# Patient Record
Sex: MALE | Race: WHITE | Employment: OTHER | URBAN - NONMETROPOLITAN AREA
[De-identification: names, ages, dates, MRNs, and addresses within clinical notes are randomized per-mention and may not be internally consistent; named-entity substitution may affect disease eponyms.]

---

## 2022-06-29 ENCOUNTER — OFFICE VISIT (OUTPATIENT)
Dept: FAMILY MEDICINE CLINIC | Age: 52
End: 2022-06-29
Payer: COMMERCIAL

## 2022-06-29 ENCOUNTER — NURSE ONLY (OUTPATIENT)
Dept: LAB | Age: 52
End: 2022-06-29
Payer: COMMERCIAL

## 2022-06-29 ENCOUNTER — HOSPITAL ENCOUNTER (OUTPATIENT)
Dept: LAB | Age: 52
Discharge: HOME | End: 2022-06-29
Payer: COMMERCIAL

## 2022-06-29 VITALS
RESPIRATION RATE: 16 BRPM | SYSTOLIC BLOOD PRESSURE: 190 MMHG | DIASTOLIC BLOOD PRESSURE: 120 MMHG | HEART RATE: 68 BPM | OXYGEN SATURATION: 98 % | WEIGHT: 221 LBS | BODY MASS INDEX: 33.49 KG/M2 | HEIGHT: 68 IN | TEMPERATURE: 98.4 F

## 2022-06-29 DIAGNOSIS — Z00.00 ROUTINE GENERAL MEDICAL EXAMINATION AT A HEALTH CARE FACILITY: Primary | ICD-10-CM

## 2022-06-29 DIAGNOSIS — I10 PRIMARY HYPERTENSION: ICD-10-CM

## 2022-06-29 DIAGNOSIS — Z11.59 ENCOUNTER FOR HCV SCREENING TEST FOR LOW RISK PATIENT: ICD-10-CM

## 2022-06-29 DIAGNOSIS — Z23 NEED FOR TDAP VACCINATION: ICD-10-CM

## 2022-06-29 DIAGNOSIS — Z11.4 SCREENING FOR HUMAN IMMUNODEFICIENCY VIRUS WITHOUT PRESENCE OF RISK FACTORS: ICD-10-CM

## 2022-06-29 DIAGNOSIS — Z12.5 SCREENING FOR PROSTATE CANCER: ICD-10-CM

## 2022-06-29 DIAGNOSIS — Z23 NEED FOR TDAP VACCINATION: Primary | ICD-10-CM

## 2022-06-29 DIAGNOSIS — Z23 NEED FOR SHINGLES VACCINE: ICD-10-CM

## 2022-06-29 DIAGNOSIS — Z00.00 ROUTINE GENERAL MEDICAL EXAMINATION AT A HEALTH CARE FACILITY: ICD-10-CM

## 2022-06-29 LAB
ABSOLUTE EOS #: 0.04 K/UL (ref 0–0.44)
ABSOLUTE IMMATURE GRANULOCYTE: <0.03 K/UL (ref 0–0.3)
ABSOLUTE LYMPH #: 0.94 K/UL (ref 1.1–3.7)
ABSOLUTE MONO #: 0.5 K/UL (ref 0.1–1.2)
ALBUMIN SERPL-MCNC: 4.4 G/DL (ref 3.5–5.2)
ALBUMIN/GLOBULIN RATIO: 1.5 (ref 1–2.5)
ALP BLD-CCNC: 48 U/L (ref 40–129)
ALT SERPL-CCNC: 19 U/L (ref 5–41)
ANION GAP SERPL CALCULATED.3IONS-SCNC: 11 MMOL/L (ref 9–17)
AST SERPL-CCNC: 18 U/L
BASOPHILS # BLD: 1 % (ref 0–2)
BASOPHILS ABSOLUTE: 0.03 K/UL (ref 0–0.2)
BILIRUB SERPL-MCNC: 0.57 MG/DL (ref 0.3–1.2)
BUN BLDV-MCNC: 13 MG/DL (ref 6–20)
BUN/CREAT BLD: 13 (ref 9–20)
CALCIUM SERPL-MCNC: 9.5 MG/DL (ref 8.6–10.4)
CHLORIDE BLD-SCNC: 100 MMOL/L (ref 98–107)
CHOLESTEROL/HDL RATIO: 2.4
CHOLESTEROL: 175 MG/DL
CO2: 25 MMOL/L (ref 20–31)
CREAT SERPL-MCNC: 1.03 MG/DL (ref 0.7–1.2)
EOSINOPHILS RELATIVE PERCENT: 1 % (ref 1–4)
GFR AFRICAN AMERICAN: >60 ML/MIN
GFR NON-AFRICAN AMERICAN: >60 ML/MIN
GFR SERPL CREATININE-BSD FRML MDRD: NORMAL ML/MIN/{1.73_M2}
GLUCOSE BLD-MCNC: 95 MG/DL (ref 70–99)
HCT VFR BLD CALC: 42.3 % (ref 40.7–50.3)
HDLC SERPL-MCNC: 74 MG/DL
HEMOGLOBIN: 14.2 G/DL (ref 13–17)
HEPATITIS C ANTIBODY: NONREACTIVE
HIV AG/AB: NONREACTIVE
IMMATURE GRANULOCYTES: 0 %
LDL CHOLESTEROL: 86 MG/DL (ref 0–130)
LYMPHOCYTES # BLD: 18 % (ref 24–43)
MCH RBC QN AUTO: 29.9 PG (ref 25.2–33.5)
MCHC RBC AUTO-ENTMCNC: 33.6 G/DL (ref 25.2–33.5)
MCV RBC AUTO: 89.1 FL (ref 82.6–102.9)
MONOCYTES # BLD: 9 % (ref 3–12)
NRBC AUTOMATED: 0 PER 100 WBC
PDW BLD-RTO: 13.5 % (ref 11.8–14.4)
PLATELET # BLD: 207 K/UL (ref 138–453)
PMV BLD AUTO: 10.6 FL (ref 8.1–13.5)
POTASSIUM SERPL-SCNC: 4.2 MMOL/L (ref 3.7–5.3)
PROSTATE SPECIFIC ANTIGEN: 1.68 NG/ML
RBC # BLD: 4.75 M/UL (ref 4.21–5.77)
SEG NEUTROPHILS: 71 % (ref 36–65)
SEGMENTED NEUTROPHILS ABSOLUTE COUNT: 3.84 K/UL (ref 1.5–8.1)
SODIUM BLD-SCNC: 136 MMOL/L (ref 135–144)
TOTAL PROTEIN: 7.4 G/DL (ref 6.4–8.3)
TRIGL SERPL-MCNC: 75 MG/DL
TSH SERPL DL<=0.05 MIU/L-ACNC: 1.28 UIU/ML (ref 0.3–5)
WBC # BLD: 5.4 K/UL (ref 3.5–11.3)

## 2022-06-29 PROCEDURE — 90471 IMMUNIZATION ADMIN: CPT | Performed by: FAMILY MEDICINE

## 2022-06-29 PROCEDURE — 86803 HEPATITIS C AB TEST: CPT

## 2022-06-29 PROCEDURE — 90472 IMMUNIZATION ADMIN EACH ADD: CPT | Performed by: FAMILY MEDICINE

## 2022-06-29 PROCEDURE — 90715 TDAP VACCINE 7 YRS/> IM: CPT | Performed by: FAMILY MEDICINE

## 2022-06-29 PROCEDURE — 84443 ASSAY THYROID STIM HORMONE: CPT

## 2022-06-29 PROCEDURE — 87389 HIV-1 AG W/HIV-1&-2 AB AG IA: CPT

## 2022-06-29 PROCEDURE — 90750 HZV VACC RECOMBINANT IM: CPT | Performed by: FAMILY MEDICINE

## 2022-06-29 PROCEDURE — G0103 PSA SCREENING: HCPCS

## 2022-06-29 PROCEDURE — 80053 COMPREHEN METABOLIC PANEL: CPT

## 2022-06-29 PROCEDURE — 80061 LIPID PANEL: CPT

## 2022-06-29 PROCEDURE — 85025 COMPLETE CBC W/AUTO DIFF WBC: CPT

## 2022-06-29 PROCEDURE — 36415 COLL VENOUS BLD VENIPUNCTURE: CPT

## 2022-06-29 PROCEDURE — 99386 PREV VISIT NEW AGE 40-64: CPT | Performed by: FAMILY MEDICINE

## 2022-06-29 RX ORDER — LISINOPRIL AND HYDROCHLOROTHIAZIDE 25; 20 MG/1; MG/1
1 TABLET ORAL DAILY
Qty: 90 TABLET | Refills: 1 | Status: SHIPPED | OUTPATIENT
Start: 2022-06-29 | End: 2022-07-14 | Stop reason: SDUPTHER

## 2022-06-29 SDOH — ECONOMIC STABILITY: FOOD INSECURITY: WITHIN THE PAST 12 MONTHS, YOU WORRIED THAT YOUR FOOD WOULD RUN OUT BEFORE YOU GOT MONEY TO BUY MORE.: NEVER TRUE

## 2022-06-29 SDOH — ECONOMIC STABILITY: FOOD INSECURITY: WITHIN THE PAST 12 MONTHS, THE FOOD YOU BOUGHT JUST DIDN'T LAST AND YOU DIDN'T HAVE MONEY TO GET MORE.: NEVER TRUE

## 2022-06-29 ASSESSMENT — ENCOUNTER SYMPTOMS
COUGH: 0
CONSTIPATION: 0
WHEEZING: 0
SINUS PRESSURE: 0
SHORTNESS OF BREATH: 0
NAUSEA: 0
RHINORRHEA: 0
VOMITING: 0
DIARRHEA: 0
SORE THROAT: 0
ABDOMINAL PAIN: 0
EYE DISCHARGE: 0
EYE REDNESS: 0
TROUBLE SWALLOWING: 0

## 2022-06-29 ASSESSMENT — PATIENT HEALTH QUESTIONNAIRE - PHQ9
SUM OF ALL RESPONSES TO PHQ QUESTIONS 1-9: 0
2. FEELING DOWN, DEPRESSED OR HOPELESS: 0
1. LITTLE INTEREST OR PLEASURE IN DOING THINGS: 0
SUM OF ALL RESPONSES TO PHQ QUESTIONS 1-9: 0
SUM OF ALL RESPONSES TO PHQ9 QUESTIONS 1 & 2: 0

## 2022-06-29 ASSESSMENT — SOCIAL DETERMINANTS OF HEALTH (SDOH): HOW HARD IS IT FOR YOU TO PAY FOR THE VERY BASICS LIKE FOOD, HOUSING, MEDICAL CARE, AND HEATING?: NOT HARD AT ALL

## 2022-06-29 NOTE — PROGRESS NOTES
2022     Elroy Gottlieb (:  1970) is a 49 y.o. male, here for evaluation of the following medical concerns:    HPI  Patient comes in today for a general physical exam and to discuss elevated blood pressure readings. Patient states he went to the eye specialist yesterday to get his vision checked and they apparently checked his blood pressure at that time. It was quite elevated at 190/120 and then subsequently was retaken yesterday evening by his sister who is a nurse practitioner and it was 199/126. He has never been diagnosed with hypertension. He has not really been into see a physician in the recent past.  Has not had any secondary symptoms such as chest pain shortness of breath blurred vision headache numbness tingling or weakness in any other extremities. Patient has not had any routine lab work done recently and so I did suggest getting updated lab work for general preventative screening. He otherwise has no other acute medical concerns. .  Patient's recent lab reports are as follows: No results found for this or any previous visit. Other review of systems are as noted below. Preventative measures are reviewed today. See health maintenance section for complete preventative plan of care. History reviewed. No pertinent past medical history. Past Surgical History:   Procedure Laterality Date    CERVICAL DISC SURGERY      HERNIA REPAIR       Family History   Problem Relation Age of Onset    Cancer Mother         pancreatic-age 61    High Blood Pressure Father     High Blood Pressure Sister      Allergies   Allergen Reactions    Pcn [Penicillins]      Unknown reaction as child           Review of Systems   Constitutional: Negative for chills, fatigue and fever. HENT: Negative for congestion, ear pain, postnasal drip, rhinorrhea, sinus pressure, sore throat and trouble swallowing. Eyes: Negative for discharge and redness.    Respiratory: Negative for cough, shortness of breath and wheezing. Cardiovascular: Negative for chest pain. Gastrointestinal: Negative for abdominal pain, constipation, diarrhea, nausea and vomiting. Genitourinary: Negative for dysuria, flank pain, frequency and urgency. Musculoskeletal: Negative for arthralgias, myalgias and neck pain. Skin: Negative for rash and wound. Allergic/Immunologic: Negative for environmental allergies. Neurological: Negative for dizziness, weakness, light-headedness and headaches. Hematological: Negative for adenopathy. Psychiatric/Behavioral: Negative. Prior to Visit Medications    Medication Sig Taking? Authorizing Provider   Tdap (ADACEL) 5-2-15.5 LF-MCG/0.5 injection Inject 0.5 mLs into the muscle once for 1 dose Yes Gaby Fuelling, DO   zoster recombinant adjuvanted vaccine [facility]) 50 MCG/0.5ML SUSR injection Inject 0.5 mLs into the muscle once for 1 dose Yes Gaby Fuelling, DO   lisinopril-hydroCHLOROthiazide (PRINZIDE;ZESTORETIC) 20-25 MG per tablet Take 1 tablet by mouth daily Yes Gaby Fuelling, DO        Social History     Tobacco Use    Smoking status: Never Smoker    Smokeless tobacco: Current User     Types: Chew   Substance Use Topics    Alcohol use: Yes        Vitals:    06/29/22 1159 06/29/22 1207   BP: (!) 160/116 (!) 190/120   Site: Left Upper Arm Left Upper Arm   Position: Sitting Sitting   Cuff Size: Large Adult Large Adult   Pulse: 68    Resp: 16    Temp: 98.4 °F (36.9 °C)    TempSrc: Tympanic    SpO2: 98%    Weight: 221 lb (100.2 kg)    Height: 5' 8\" (1.727 m)      Estimated body mass index is 33.6 kg/m² as calculated from the following:    Height as of this encounter: 5' 8\" (1.727 m). Weight as of this encounter: 221 lb (100.2 kg). Physical Exam  Vitals and nursing note reviewed. Constitutional:       General: He is not in acute distress. Appearance: Normal appearance. He is well-developed. He is not diaphoretic.    HENT:      Head: Normocephalic and atraumatic. Right Ear: Tympanic membrane, ear canal and external ear normal.      Left Ear: Tympanic membrane, ear canal and external ear normal.      Nose: Nose normal.      Mouth/Throat:      Mouth: Mucous membranes are moist.      Pharynx: Oropharynx is clear. No oropharyngeal exudate. Eyes:      General:         Right eye: No discharge. Left eye: No discharge. Conjunctiva/sclera: Conjunctivae normal.      Pupils: Pupils are equal, round, and reactive to light. Neck:      Thyroid: No thyromegaly. Cardiovascular:      Rate and Rhythm: Normal rate and regular rhythm. Heart sounds: Normal heart sounds. Pulmonary:      Effort: Pulmonary effort is normal.      Breath sounds: Normal breath sounds. No wheezing or rales. Abdominal:      General: Bowel sounds are normal. There is no distension. Palpations: Abdomen is soft. Tenderness: There is no abdominal tenderness. Musculoskeletal:      Cervical back: Normal range of motion and neck supple. Lymphadenopathy:      Cervical: No cervical adenopathy. Skin:     General: Skin is warm and dry. Findings: No rash. Neurological:      Mental Status: He is alert and oriented to person, place, and time. Psychiatric:         Behavior: Behavior normal.         Thought Content: Thought content normal.         Judgment: Judgment normal.           ASSESSMENT/PLAN:  Encounter Diagnoses   Name Primary?     Routine general medical examination at a health care facility Yes    Primary hypertension     Screening for human immunodeficiency virus without presence of risk factors     Encounter for HCV screening test for low risk patient     Need for Tdap vaccination     Screening for prostate cancer      Orders Placed This Encounter   Medications    Tdap (ADACEL) 5-2-15.5 LF-MCG/0.5 injection     Sig: Inject 0.5 mLs into the muscle once for 1 dose     Dispense:  1 each     Refill:  0    zoster recombinant adjuvanted vaccine [facility]) 50 MCG/0.5ML SUSR injection     Sig: Inject 0.5 mLs into the muscle once for 1 dose     Dispense:  1 each     Refill:  1    lisinopril-hydroCHLOROthiazide (PRINZIDE;ZESTORETIC) 20-25 MG per tablet     Sig: Take 1 tablet by mouth daily     Dispense:  90 tablet     Refill:  1     Orders Placed This Encounter   Procedures    Hepatitis C Antibody     Standing Status:   Future     Number of Occurrences:   1     Standing Expiration Date:   6/29/2023    HIV Screen     Standing Status:   Future     Number of Occurrences:   1     Standing Expiration Date:   6/29/2023    CBC with Auto Differential     Standing Status:   Future     Number of Occurrences:   1     Standing Expiration Date:   6/29/2023    Comprehensive Metabolic Panel     Standing Status:   Future     Number of Occurrences:   1     Standing Expiration Date:   6/29/2023    Lipid Panel     Standing Status:   Future     Number of Occurrences:   1     Standing Expiration Date:   6/29/2023     Order Specific Question:   Is Patient Fasting?/# of Hours     Answer:   12    TSH     Standing Status:   Future     Number of Occurrences:   1     Standing Expiration Date:   6/29/2023    PSA Screening     Standing Status:   Future     Number of Occurrences:   1     Standing Expiration Date:   6/29/2023     Did start patient on lisinopril hydrochlorothiazide to help with his blood pressure control. Did discuss secondary symptoms of hypertension and if he does develop any concerning symptoms he does need to go to the emergency room. My suspicion is that he has had elevated blood pressure for some time and just was unaware of it. Do want him to check his blood pressure at home. We will have him come back in 2 weeks for blood pressure recheck. Patient is to get  labwork done today. We will make further recommendations once testing reports are available.     Patient is to return to my office in 2 weeks for a med recheck or sooner if any further problems or symptoms arise. (Please note that portions of this note were completed with a voice recognition program. Efforts were made to edit the dictations but occasionally words are mis-transcribed.)        Return in about 2 weeks (around 7/13/2022). An electronic signature was used to authenticate this note.     --Mary Ann Cordero, DO on 6/29/2022 at 2:34 PM

## 2022-06-29 NOTE — PROGRESS NOTES
Immunizations given as ordered. Patient tolerated it well. No questions re:VIS information. Patient instructed to return after 2 months for 2nd Shingrix vaccine.

## 2022-07-14 ENCOUNTER — HOSPITAL ENCOUNTER (OUTPATIENT)
Dept: LAB | Age: 52
Discharge: HOME | End: 2022-07-14
Payer: COMMERCIAL

## 2022-07-14 ENCOUNTER — OFFICE VISIT (OUTPATIENT)
Dept: FAMILY MEDICINE CLINIC | Age: 52
End: 2022-07-14
Payer: COMMERCIAL

## 2022-07-14 VITALS
OXYGEN SATURATION: 99 % | BODY MASS INDEX: 33.34 KG/M2 | DIASTOLIC BLOOD PRESSURE: 70 MMHG | WEIGHT: 220 LBS | HEIGHT: 68 IN | TEMPERATURE: 97.7 F | SYSTOLIC BLOOD PRESSURE: 130 MMHG | HEART RATE: 83 BPM

## 2022-07-14 DIAGNOSIS — Z12.5 SCREENING FOR PROSTATE CANCER: ICD-10-CM

## 2022-07-14 DIAGNOSIS — I10 PRIMARY HYPERTENSION: Primary | ICD-10-CM

## 2022-07-14 DIAGNOSIS — I10 PRIMARY HYPERTENSION: ICD-10-CM

## 2022-07-14 DIAGNOSIS — Z00.00 ROUTINE GENERAL MEDICAL EXAMINATION AT A HEALTH CARE FACILITY: ICD-10-CM

## 2022-07-14 DIAGNOSIS — B35.1 ONYCHOMYCOSIS: ICD-10-CM

## 2022-07-14 LAB
ABSOLUTE EOS #: 0.04 K/UL (ref 0–0.44)
ABSOLUTE IMMATURE GRANULOCYTE: <0.03 K/UL (ref 0–0.3)
ABSOLUTE LYMPH #: 1.31 K/UL (ref 1.1–3.7)
ABSOLUTE MONO #: 0.66 K/UL (ref 0.1–1.2)
ALBUMIN SERPL-MCNC: 4.5 G/DL (ref 3.5–5.2)
ALBUMIN/GLOBULIN RATIO: 1.3 (ref 1–2.5)
ALP BLD-CCNC: 52 U/L (ref 40–129)
ALT SERPL-CCNC: 17 U/L (ref 5–41)
ANION GAP SERPL CALCULATED.3IONS-SCNC: 13 MMOL/L (ref 9–17)
AST SERPL-CCNC: 17 U/L
BASOPHILS # BLD: 1 % (ref 0–2)
BASOPHILS ABSOLUTE: 0.05 K/UL (ref 0–0.2)
BILIRUB SERPL-MCNC: 0.46 MG/DL (ref 0.3–1.2)
BUN BLDV-MCNC: 26 MG/DL (ref 6–20)
BUN/CREAT BLD: 23 (ref 9–20)
CALCIUM SERPL-MCNC: 10.2 MG/DL (ref 8.6–10.4)
CHLORIDE BLD-SCNC: 95 MMOL/L (ref 98–107)
CHOLESTEROL/HDL RATIO: 3.3
CHOLESTEROL: 192 MG/DL
CO2: 26 MMOL/L (ref 20–31)
CREAT SERPL-MCNC: 1.12 MG/DL (ref 0.7–1.2)
EOSINOPHILS RELATIVE PERCENT: 1 % (ref 1–4)
GFR AFRICAN AMERICAN: >60 ML/MIN
GFR NON-AFRICAN AMERICAN: >60 ML/MIN
GFR SERPL CREATININE-BSD FRML MDRD: ABNORMAL ML/MIN/{1.73_M2}
GLUCOSE BLD-MCNC: 111 MG/DL (ref 70–99)
HCT VFR BLD CALC: 44.2 % (ref 40.7–50.3)
HDLC SERPL-MCNC: 58 MG/DL
HEMOGLOBIN: 14.8 G/DL (ref 13–17)
IMMATURE GRANULOCYTES: 0 %
LDL CHOLESTEROL: 110 MG/DL (ref 0–130)
LYMPHOCYTES # BLD: 18 % (ref 24–43)
MCH RBC QN AUTO: 29.8 PG (ref 25.2–33.5)
MCHC RBC AUTO-ENTMCNC: 33.5 G/DL (ref 25.2–33.5)
MCV RBC AUTO: 88.9 FL (ref 82.6–102.9)
MONOCYTES # BLD: 9 % (ref 3–12)
NRBC AUTOMATED: 0 PER 100 WBC
PDW BLD-RTO: 13 % (ref 11.8–14.4)
PLATELET # BLD: 241 K/UL (ref 138–453)
PMV BLD AUTO: 10.6 FL (ref 8.1–13.5)
POTASSIUM SERPL-SCNC: 3.9 MMOL/L (ref 3.7–5.3)
PROSTATE SPECIFIC ANTIGEN: 1.68 NG/ML
RBC # BLD: 4.97 M/UL (ref 4.21–5.77)
SEG NEUTROPHILS: 71 % (ref 36–65)
SEGMENTED NEUTROPHILS ABSOLUTE COUNT: 5.2 K/UL (ref 1.5–8.1)
SODIUM BLD-SCNC: 134 MMOL/L (ref 135–144)
TOTAL PROTEIN: 8 G/DL (ref 6.4–8.3)
TRIGL SERPL-MCNC: 119 MG/DL
WBC # BLD: 7.3 K/UL (ref 3.5–11.3)

## 2022-07-14 PROCEDURE — 85025 COMPLETE CBC W/AUTO DIFF WBC: CPT

## 2022-07-14 PROCEDURE — 36415 COLL VENOUS BLD VENIPUNCTURE: CPT

## 2022-07-14 PROCEDURE — 99214 OFFICE O/P EST MOD 30 MIN: CPT | Performed by: FAMILY MEDICINE

## 2022-07-14 PROCEDURE — 3017F COLORECTAL CA SCREEN DOC REV: CPT | Performed by: FAMILY MEDICINE

## 2022-07-14 PROCEDURE — G8427 DOCREV CUR MEDS BY ELIG CLIN: HCPCS | Performed by: FAMILY MEDICINE

## 2022-07-14 PROCEDURE — 4004F PT TOBACCO SCREEN RCVD TLK: CPT | Performed by: FAMILY MEDICINE

## 2022-07-14 PROCEDURE — G0103 PSA SCREENING: HCPCS

## 2022-07-14 PROCEDURE — 80061 LIPID PANEL: CPT

## 2022-07-14 PROCEDURE — 80053 COMPREHEN METABOLIC PANEL: CPT

## 2022-07-14 PROCEDURE — G8417 CALC BMI ABV UP PARAM F/U: HCPCS | Performed by: FAMILY MEDICINE

## 2022-07-14 RX ORDER — LISINOPRIL AND HYDROCHLOROTHIAZIDE 25; 20 MG/1; MG/1
1 TABLET ORAL 2 TIMES DAILY
Qty: 180 TABLET | Refills: 3 | Status: SHIPPED | OUTPATIENT
Start: 2022-07-14 | End: 2022-10-31 | Stop reason: SINTOL

## 2022-07-14 RX ORDER — TERBINAFINE HYDROCHLORIDE 250 MG/1
250 TABLET ORAL DAILY
Qty: 90 TABLET | Refills: 0 | Status: SHIPPED | OUTPATIENT
Start: 2022-07-14 | End: 2022-10-12

## 2022-07-14 ASSESSMENT — ENCOUNTER SYMPTOMS
EYE REDNESS: 0
RHINORRHEA: 0
SORE THROAT: 0
SINUS PRESSURE: 0
DIARRHEA: 0
NAUSEA: 0
VOMITING: 0
EYE DISCHARGE: 0
WHEEZING: 0
CONSTIPATION: 0
TROUBLE SWALLOWING: 0
COUGH: 0
ABDOMINAL PAIN: 0
COLOR CHANGE: 1
SHORTNESS OF BREATH: 0

## 2022-07-14 NOTE — PROGRESS NOTES
2022     Ti Gaming (:  1970) is a 49 y.o. male, here for evaluation of the following medical concerns:    HPI  Patient comes in today for follow-up regarding his hypertension. Patient was started on antihypertensive medication at his last visit after he had went to get his eyes checked and when they checked his blood pressure it was quite elevated. He did do well with the medication initially as his pressure dropped quite a bit but then it started to start creeping up. After 5 days duration he ultimately did start taking the medication twice a day and since then his blood pressure has been improved. The only downside is that his muscles have been cramping slightly. He did state that he started to drink more water when his muscle started to cramp and that did seem to help but still felt some muscle aching. He did go and get an over-the-counter potassium supplement has been taking 1 a day over the last few days which has helped. Currently not having any issues with significant muscular pain but I did advise him we should get a basic metabolic panel just to check his potassium level with the higher dose blood pressure medication. He seems to be tolerating this relatively well. Patient's only other acute issue today is that he does have 3 toes on his right foot that do have evidence of fungus. He states that they have been that way for quite some time and he has used over-the-counter topical treatments with little relief. Wondering about getting something prescription based that will help clear his toenail fungus. Does not have any other acute medical concerns at this time to discuss. Did review patient's med list, allergies, social history, fam history, pmhx and pshx today as noted in the record. Review of Systems   Constitutional: Negative for chills, fatigue and fever.    HENT: Negative for congestion, ear pain, postnasal drip, rhinorrhea, sinus pressure, sore throat and trouble swallowing. Eyes: Negative for discharge and redness. Respiratory: Negative for cough, shortness of breath and wheezing. Cardiovascular: Negative for chest pain. Gastrointestinal: Negative for abdominal pain, constipation, diarrhea, nausea and vomiting. Genitourinary: Negative for dysuria, flank pain, frequency and urgency. Musculoskeletal: Positive for myalgias. Negative for arthralgias and neck pain. Skin: Positive for color change (thickened discolored toenails). Negative for rash and wound. Allergic/Immunologic: Negative for environmental allergies. Neurological: Negative for dizziness, weakness, light-headedness and headaches. Hematological: Negative for adenopathy. Psychiatric/Behavioral: Negative. Prior to Visit Medications    Medication Sig Taking? Authorizing Provider   lisinopril-hydroCHLOROthiazide (PRINZIDE;ZESTORETIC) 20-25 MG per tablet Take 1 tablet by mouth daily  Jayson Hill, DO        Social History     Tobacco Use    Smoking status: Never Smoker    Smokeless tobacco: Current User     Types: Chew   Substance Use Topics    Alcohol use: Yes        There were no vitals filed for this visit. Estimated body mass index is 33.6 kg/m² as calculated from the following:    Height as of 6/29/22: 5' 8\" (1.727 m). Weight as of 6/29/22: 221 lb (100.2 kg). Physical Exam  Vitals and nursing note reviewed. Constitutional:       General: He is not in acute distress. Appearance: He is well-developed. He is not diaphoretic. HENT:      Head: Normocephalic and atraumatic. Eyes:      Conjunctiva/sclera: Conjunctivae normal.   Cardiovascular:      Rate and Rhythm: Normal rate and regular rhythm. Pulmonary:      Effort: Pulmonary effort is normal.      Breath sounds: No wheezing, rhonchi or rales. Musculoskeletal:      Cervical back: Normal range of motion. Right lower leg: No edema. Left lower leg: No edema.    Skin:     General: Skin is warm and dry. Coloration: Skin is not pale. Findings: No erythema or rash. Neurological:      Mental Status: He is alert and oriented to person, place, and time. Psychiatric:         Behavior: Behavior normal.         Thought Content: Thought content normal.         Judgment: Judgment normal.         ASSESSMENT/PLAN:    Encounter Diagnoses   Name Primary?  Primary hypertension Yes    Onychomycosis     Routine general medical examination at a health care facility     Screening for prostate cancer        Orders Placed This Encounter   Medications    lisinopril-hydroCHLOROthiazide (PRINZIDE;ZESTORETIC) 20-25 MG per tablet     Sig: Take 1 tablet by mouth 2 times daily     Dispense:  180 tablet     Refill:  3    terbinafine (LAMISIL) 250 MG tablet     Sig: Take 1 tablet by mouth daily     Dispense:  90 tablet     Refill:  0     Orders Placed This Encounter   Procedures    Lipid Panel     Standing Status:   Future     Number of Occurrences:   1     Standing Expiration Date:   1/10/2024     Order Specific Question:   Is Patient Fasting?/# of Hours     Answer:   yes, 12    Comprehensive Metabolic Panel     Standing Status:   Future     Number of Occurrences:   1     Standing Expiration Date:   1/10/2024    CBC with Auto Differential     Standing Status:   Future     Number of Occurrences:   1     Standing Expiration Date:   1/10/2024    PSA Screening     Standing Status:   Future     Number of Occurrences:   1     Standing Expiration Date:   1/10/2024     Patient is to continue on the blood pressure medication at his current dose. We will have him take the medicine twice daily and just continue with an over-the-counter potassium supplement. Will get his electrolytes checked today. Patient is given Lamisil to help clear the onychomycosis. Is aware that this can take up to a year to completely grow out clean nail. Should take the Lamisil for 3 months duration.     Patient is to return to my office in 1 year for routine general physical exam and follow-up of his chronic health conditions or sooner if any further problems or symptoms arise. (Please note that portions of this note were completed with a voice recognition program. Efforts were made to edit the dictations but occasionally words are mis-transcribed.)        No follow-ups on file. An electronic signature was used to authenticate this note.     --Sandra Rousseau,  on 7/14/2022 at 7:38 AM

## 2022-07-15 DIAGNOSIS — Z12.5 SCREENING FOR PROSTATE CANCER: ICD-10-CM

## 2022-07-15 DIAGNOSIS — Z00.00 ROUTINE GENERAL MEDICAL EXAMINATION AT A HEALTH CARE FACILITY: Primary | ICD-10-CM

## 2022-07-15 NOTE — PROGRESS NOTES
Lab orders replaced from 6/29/2022 as lab michell and released labs that were not due until June of 2023

## 2022-08-02 ENCOUNTER — APPOINTMENT (OUTPATIENT)
Dept: GENERAL RADIOLOGY | Age: 52
End: 2022-08-02
Payer: COMMERCIAL

## 2022-08-02 ENCOUNTER — HOSPITAL ENCOUNTER (EMERGENCY)
Age: 52
Discharge: HOME | End: 2022-08-02
Attending: EMERGENCY MEDICINE
Payer: COMMERCIAL

## 2022-08-02 VITALS
TEMPERATURE: 96.8 F | DIASTOLIC BLOOD PRESSURE: 80 MMHG | OXYGEN SATURATION: 97 % | RESPIRATION RATE: 16 BRPM | BODY MASS INDEX: 31.1 KG/M2 | HEIGHT: 69 IN | WEIGHT: 210 LBS | SYSTOLIC BLOOD PRESSURE: 108 MMHG | HEART RATE: 48 BPM

## 2022-08-02 DIAGNOSIS — S62.660B OPEN NONDISPLACED FRACTURE OF DISTAL PHALANX OF RIGHT INDEX FINGER, INITIAL ENCOUNTER: Primary | ICD-10-CM

## 2022-08-02 DIAGNOSIS — S61.222A LACERATION OF RIGHT MIDDLE FINGER WITH FOREIGN BODY WITHOUT DAMAGE TO NAIL, INITIAL ENCOUNTER: ICD-10-CM

## 2022-08-02 PROCEDURE — 11760 REPAIR OF NAIL BED: CPT

## 2022-08-02 PROCEDURE — 12041 INTMD RPR N-HF/GENIT 2.5CM/<: CPT

## 2022-08-02 PROCEDURE — 2500000003 HC RX 250 WO HCPCS: Performed by: EMERGENCY MEDICINE

## 2022-08-02 PROCEDURE — 99283 EMERGENCY DEPT VISIT LOW MDM: CPT

## 2022-08-02 PROCEDURE — 6370000000 HC RX 637 (ALT 250 FOR IP): Performed by: EMERGENCY MEDICINE

## 2022-08-02 PROCEDURE — 73130 X-RAY EXAM OF HAND: CPT

## 2022-08-02 RX ORDER — HYDROCODONE BITARTRATE AND ACETAMINOPHEN 5; 325 MG/1; MG/1
TABLET ORAL
Status: DISPENSED
Start: 2022-08-02 | End: 2022-08-02

## 2022-08-02 RX ORDER — DOXYCYCLINE 100 MG/1
100 CAPSULE ORAL ONCE
Status: COMPLETED | OUTPATIENT
Start: 2022-08-02 | End: 2022-08-02

## 2022-08-02 RX ORDER — DOXYCYCLINE 100 MG/1
CAPSULE ORAL
Status: DISPENSED
Start: 2022-08-02 | End: 2022-08-02

## 2022-08-02 RX ORDER — DOXYCYCLINE HYCLATE 100 MG/1
100 CAPSULE ORAL 2 TIMES DAILY
Qty: 20 CAPSULE | Refills: 0 | Status: SHIPPED | OUTPATIENT
Start: 2022-08-02 | End: 2022-08-12

## 2022-08-02 RX ORDER — HYDROCODONE BITARTRATE AND ACETAMINOPHEN 5; 325 MG/1; MG/1
1 TABLET ORAL EVERY 6 HOURS PRN
Qty: 20 TABLET | Refills: 0 | Status: SHIPPED | OUTPATIENT
Start: 2022-08-02 | End: 2022-08-05

## 2022-08-02 RX ORDER — LIDOCAINE HYDROCHLORIDE 10 MG/ML
20 INJECTION, SOLUTION INFILTRATION; PERINEURAL ONCE
Status: COMPLETED | OUTPATIENT
Start: 2022-08-02 | End: 2022-08-02

## 2022-08-02 RX ORDER — HYDROCODONE BITARTRATE AND ACETAMINOPHEN 5; 325 MG/1; MG/1
1 TABLET ORAL ONCE
Status: COMPLETED | OUTPATIENT
Start: 2022-08-02 | End: 2022-08-02

## 2022-08-02 RX ORDER — DIAPER,BRIEF,INFANT-TODD,DISP
EACH MISCELLANEOUS ONCE
Status: COMPLETED | OUTPATIENT
Start: 2022-08-02 | End: 2022-08-02

## 2022-08-02 RX ADMIN — HYDROCODONE BITARTRATE AND ACETAMINOPHEN 1 TABLET: 5; 325 TABLET ORAL at 10:45

## 2022-08-02 RX ADMIN — DOXYCYCLINE 100 MG: 100 CAPSULE ORAL at 10:45

## 2022-08-02 RX ADMIN — LIDOCAINE HYDROCHLORIDE 10 ML: 10 INJECTION, SOLUTION INFILTRATION; PERINEURAL at 09:40

## 2022-08-02 RX ADMIN — BACITRACIN ZINC: 500 OINTMENT TOPICAL at 10:40

## 2022-08-02 RX ADMIN — LIDOCAINE HYDROCHLORIDE 10 ML: 10 INJECTION, SOLUTION INFILTRATION; PERINEURAL at 09:24

## 2022-08-02 ASSESSMENT — ENCOUNTER SYMPTOMS
COUGH: 0
VOMITING: 0
SORE THROAT: 0
SHORTNESS OF BREATH: 0

## 2022-08-02 ASSESSMENT — PAIN DESCRIPTION - LOCATION: LOCATION: HAND

## 2022-08-02 ASSESSMENT — PAIN SCALES - GENERAL
PAINLEVEL_OUTOF10: 4
PAINLEVEL_OUTOF10: 4
PAINLEVEL_OUTOF10: 5

## 2022-08-02 ASSESSMENT — PAIN DESCRIPTION - ORIENTATION: ORIENTATION: RIGHT

## 2022-08-02 ASSESSMENT — PAIN - FUNCTIONAL ASSESSMENT: PAIN_FUNCTIONAL_ASSESSMENT: 0-10

## 2022-08-02 NOTE — DISCHARGE INSTRUCTIONS
Norco for pain, doxycycline as directed, do not eat or drink anything after midnight and go to Dr. Lois Perez office as directed in the morning for repair

## 2022-08-02 NOTE — ED PROVIDER NOTES
[facility]  eMERGENCY dEPARTMENT eNCOUnter      Pt Name: Josie Hernandez  MRN: [id number]  [place] 1970  Date of evaluation: 2022      CHIEF COMPLAINT       Chief Complaint   Patient presents with    Hand Injury         HISTORY OF PRESENT ILLNESS    Josie Hernandez is a 49 y.o. 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 HENT:  Negative for congestion and sore throat. Respiratory:  Negative for cough and shortness of breath. Gastrointestinal:  Negative for vomiting. Musculoskeletal:         Right hand pain left hand pain as well   Skin:  Negative for pallor and rash. PAST MEDICAL HISTORY    has a past medical history of Hypertension. SURGICAL HISTORY      has a past surgical history that includes hernia repair (); Cervical disc surgery (); and Hand tendon surgery (Right, 8/3/2022). CURRENT MEDICATIONS       Discharge Medication List as of 2022 10:44 AM        CONTINUE these medications which have NOT CHANGED    Details   Potassium (POTASSIMIN PO) Take by mouth OTC, not sure of doseHistorical Med      lisinopril-hydroCHLOROthiazide (PRINZIDE;ZESTORETIC) 20-25 MG per tablet Take 1 tablet by mouth 2 times daily, Disp-180 tablet, R-3Normal      terbinafine (LAMISIL) 250 MG tablet Take 1 tablet by mouth daily, Disp-90 tablet, R-0Normal             ALLERGIES     is allergic to pcn [penicillins]. FAMILY HISTORY     He indicated that his mother is . He indicated that his father is alive. He indicated that both of his sisters are alive. family history includes Cancer in his mother; High Blood Pressure in his father and sister.     SOCIAL HISTORY      reports that he has never smoked. He has quit using smokeless tobacco.  His smokeless tobacco use included chew. He reports current alcohol use. He reports that he does not use drugs. PHYSICAL EXAM     INITIAL VITALS:  height is 5' 8.5\" (1.74 m) and weight is 210 lb (95.3 kg). His temperature is 96.8 °F (36 °C). His blood pressure is 108/80 and his pulse is 48 (abnormal). His respiration is 16 and oxygen saturation is 97%. Physical Exam  Constitutional:       General: He is not in acute distress. Appearance: Normal appearance. He is well-developed. He is not ill-appearing, toxic-appearing or diaphoretic. HENT:      Head: Normocephalic and atraumatic. Eyes:      Pupils: Pupils are equal, round, and reactive to light. Cardiovascular:      Rate and Rhythm: Normal rate and regular rhythm. Pulmonary:      Effort: Pulmonary effort is normal.      Breath sounds: Normal breath sounds. Abdominal:      General: Bowel sounds are normal.   Musculoskeletal:         General: Normal range of motion. Cervical back: Normal range of motion and neck supple. Comments: Patient has injuries to the index and long finger of the right hand he is got 2 lacerations of the distal index finger on the dorsum at the level of the DIP joint there is exposed bone the nail itself is intact the pad is a little dusky but does have some capillary refill difficulty with extension at the DIP full range of motion at the MCP and PIP the long finger has a 1/2 cm laceration right over the DIP on the extensor surface and is unable to extend patient has some superficial avulsion type abrasions to the left hand but no deep lacerations he has full range of motion of that hand with no bony deformity   Skin:     General: Skin is warm and dry. Neurological:      General: No focal deficit present. Mental Status: He is alert and oriented to person, place, and time.    Psychiatric:         Behavior: Behavior normal.         DIFFERENTIAL DIAGNOSIS/ MDM:     Open fracture distal phalanx right index finger with probable extensor tendon damage and near amputation laceration with likely open injury of extensor tendon long finger    DIAGNOSTIC RESULTS     EKG: All EKG's are interpreted by the Emergency Department Physician who either signs or Co-signs this chart in the absence of a cardiologist.        RADIOLOGY:   I directly visualized the following  images and reviewed the radiologist interpretations:          ED BEDSIDE ULTRASOUND:       LABS:  Labs Reviewed - No data to display        EMERGENCY DEPARTMENT COURSE:   Vitals:    Vitals:    08/02/22 0859 08/02/22 0900 08/02/22 0930 08/02/22 0945   BP: 104/61 107/66 109/82 108/80   Pulse: 50   (!) 48   Resp: 16      Temp: 96.8 °F (36 °C)      SpO2: 96% 96% 99% 97%   Weight: 210 lb (95.3 kg)      Height: 5' 8.5\" (1.74 m)        -------------------------  BP: 108/80, Temp: 96.8 °F (36 °C), Heart Rate: (!) 48, Resp: 16        Re-evaluation Notes    Resting comfortably after sutures placed    CRITICAL CARE:   None        CONSULTS: I discussed the case with Dr. Elvis Finnegan of orthopedics he recommended irrigation and cleaning the wounds closed loosely tacking them down and a referral to hand  Case was discussed with Dr. Gigi Hollis who recommended n.p.o. after midnight follow-up in his office tomorrow for possible extensor tendon repair of the index and long finger possible revision of near amputation of the index finger as well    PROCEDURES:  Serration repair right hand 1-1/2 cm laceration to long finger over the PIP joint extensor surface with probable tendon damage the wound is very irregular the skin around the wound was slightly buried in chart secondary to the belt on the auger that his hand was caught in neurovascularly intact unable to extend however at the knee joint the laceration was anesthetized with a digital block using 1% lidocaine the wound was then explored to foreign bodies removed there is quite a bit of dirt it was copiously irrigated with sterile saline and loosely approximated with two 4-0 Ethilon sutures    The 2 lacerations on the index finger 1 right at the DIP joint extensor surface and 1 just below the nail were anesthetized with 1% lidocaine in a digital block fashion the wounds were copiously irrigated a lot of foreign material was removed from both the lower laceration which was a centimeter and a half was closed with two 4-0 Ethilon sutures the laceration along the nailbed itself was closed with a four 4-0 Ethilon sutures bacitracin dressing and splint was applied to be placed on doxycycline pain medication  Patient tolerated the procedure well  FINAL IMPRESSION      1. Open nondisplaced fracture of distal phalanx of right index finger, initial encounter    2. Laceration of right middle finger with foreign body without damage to nail, initial encounter          DISPOSITION/PLAN   DISPOSITION Decision To Discharge  Discharged    Condition on Disposition    Stable    PATIENT REFERRED TO:  Florentino Flores MD  [id number] [facility]  [phone]          DISCHARGE MEDICATIONS:  Discharge Medication List as of 8/2/2022 10:44 AM        START taking these medications    Details   doxycycline hyclate (VIBRAMYCIN) 100 MG capsule Take 1 capsule by mouth in the morning and 1 capsule before bedtime. Do all this for 10 days. , Disp-20 capsule, R-0Normal      HYDROcodone-acetaminophen (NORCO) 5-325 MG per tablet Take 1 tablet by mouth every 6 hours as needed for Pain for up to 3 days. , Disp-20 tablet, R-0Print             (Please note that portions of this note were completed with a voice recognition program.  Efforts were made to edit the dictations but occasionally words are mis-transcribed.)    Kristen Foster MD,, MD, F.A.A.E.M.   Attending Emergency Physician                           Kristen Foster MD  08/03/22 5225 [facility] MD Javier  08/22/22 4450

## 2022-08-03 ENCOUNTER — HOSPITAL ENCOUNTER (OUTPATIENT)
Age: 52
Setting detail: OUTPATIENT SURGERY
Discharge: HOME | End: 2022-08-03
Attending: ORTHOPAEDIC SURGERY | Admitting: ORTHOPAEDIC SURGERY
Payer: COMMERCIAL

## 2022-08-03 ENCOUNTER — OFFICE VISIT (OUTPATIENT)
Dept: ORTHOPEDIC SURGERY | Age: 52
End: 2022-08-03
Payer: COMMERCIAL

## 2022-08-03 VITALS
RESPIRATION RATE: 16 BRPM | DIASTOLIC BLOOD PRESSURE: 79 MMHG | WEIGHT: 216 LBS | TEMPERATURE: 97.6 F | OXYGEN SATURATION: 97 % | HEART RATE: 53 BPM | BODY MASS INDEX: 32.84 KG/M2 | SYSTOLIC BLOOD PRESSURE: 131 MMHG

## 2022-08-03 VITALS
WEIGHT: 210 LBS | HEART RATE: 62 BPM | DIASTOLIC BLOOD PRESSURE: 84 MMHG | HEIGHT: 68 IN | SYSTOLIC BLOOD PRESSURE: 124 MMHG | BODY MASS INDEX: 31.83 KG/M2

## 2022-08-03 DIAGNOSIS — S61.209A EXTENSOR TENDON LACERATION, FINGER, OPEN WOUND, INITIAL ENCOUNTER: Primary | ICD-10-CM

## 2022-08-03 DIAGNOSIS — S56.429A EXTENSOR TENDON LACERATION, FINGER, OPEN WOUND, INITIAL ENCOUNTER: Primary | ICD-10-CM

## 2022-08-03 DIAGNOSIS — S56.429A EXTENSOR TENDON LACERATION OF FINGER WITH OPEN WOUND, INITIAL ENCOUNTER: Primary | ICD-10-CM

## 2022-08-03 DIAGNOSIS — S61.209A EXTENSOR TENDON LACERATION OF FINGER WITH OPEN WOUND, INITIAL ENCOUNTER: Primary | ICD-10-CM

## 2022-08-03 PROCEDURE — 26418 REPAIR FINGER TENDON: CPT | Performed by: ORTHOPAEDIC SURGERY

## 2022-08-03 PROCEDURE — 26750 TREAT FINGER FRACTURE EACH: CPT | Performed by: ORTHOPAEDIC SURGERY

## 2022-08-03 PROCEDURE — 3600000002 HC SURGERY LEVEL 2 BASE: Performed by: ORTHOPAEDIC SURGERY

## 2022-08-03 PROCEDURE — 3017F COLORECTAL CA SCREEN DOC REV: CPT | Performed by: ORTHOPAEDIC SURGERY

## 2022-08-03 PROCEDURE — 7100000010 HC PHASE II RECOVERY - FIRST 15 MIN: Performed by: ORTHOPAEDIC SURGERY

## 2022-08-03 PROCEDURE — 7100000011 HC PHASE II RECOVERY - ADDTL 15 MIN: Performed by: ORTHOPAEDIC SURGERY

## 2022-08-03 PROCEDURE — 3600000012 HC SURGERY LEVEL 2 ADDTL 15MIN: Performed by: ORTHOPAEDIC SURGERY

## 2022-08-03 PROCEDURE — G8427 DOCREV CUR MEDS BY ELIG CLIN: HCPCS | Performed by: ORTHOPAEDIC SURGERY

## 2022-08-03 PROCEDURE — 1036F TOBACCO NON-USER: CPT | Performed by: ORTHOPAEDIC SURGERY

## 2022-08-03 PROCEDURE — 2709999900 HC NON-CHARGEABLE SUPPLY: Performed by: ORTHOPAEDIC SURGERY

## 2022-08-03 PROCEDURE — 99203 OFFICE O/P NEW LOW 30 MIN: CPT | Performed by: ORTHOPAEDIC SURGERY

## 2022-08-03 PROCEDURE — G8417 CALC BMI ABV UP PARAM F/U: HCPCS | Performed by: ORTHOPAEDIC SURGERY

## 2022-08-03 PROCEDURE — 2500000003 HC RX 250 WO HCPCS: Performed by: ORTHOPAEDIC SURGERY

## 2022-08-03 RX ORDER — ACETAMINOPHEN AND CODEINE PHOSPHATE 300; 30 MG/1; MG/1
1-2 TABLET ORAL PRN
Qty: 30 TABLET | Refills: 0 | Status: SHIPPED | OUTPATIENT
Start: 2022-08-03 | End: 2022-08-06

## 2022-08-03 ASSESSMENT — PAIN DESCRIPTION - LOCATION: LOCATION: FINGER (COMMENT WHICH ONE)

## 2022-08-03 ASSESSMENT — PAIN DESCRIPTION - PAIN TYPE: TYPE: SURGICAL PAIN

## 2022-08-03 ASSESSMENT — PAIN DESCRIPTION - ORIENTATION: ORIENTATION: RIGHT

## 2022-08-03 ASSESSMENT — PAIN SCALES - GENERAL: PAINLEVEL_OUTOF10: 2

## 2022-08-03 ASSESSMENT — PAIN - FUNCTIONAL ASSESSMENT: PAIN_FUNCTIONAL_ASSESSMENT: 0-10

## 2022-08-03 NOTE — OP NOTE
Operative Note      Patient: Femi Zamarripa   YOB: 1970   MRN: [id number]     Date of Procedure: 8/3/2022      Pre-Op Diagnosis: Rupture of extensor tendons of right hand and wrist, initial encounter [S66.258Q]      Post-Op Diagnosis: Same       Procedure(s):  Procedure(s):  Repair Extensor Tendon Right Middle Finger W/ RIGHT MIDDLE FINGER DEBRIDEMENT ET RIGHT INDEX FINGER FRACTURE CARE      Surgeon(s):  rBannon Mcdonald MD    Assistant:   None    Anesthesia: Local      Estimated Blood Loss (mL): Minimal    Complications: None    Specimens: None    Implants:None      Drains: None    Findings: Wound overlying the index finger approximated. The adjacent long finger of the right hand noted was complete disruption of the extensor tendon mechanism at the level of the DIP joint    Detailed Description of Procedure:   Patient was transferred to the operating room whereby the right upper extremity was prepped and draped for the proposed procedure. Under sterile technique a digital block to the long finger was established. Previously placed sutures removed. Finger turnicot was applied. Wound exploration revealed complete transection of the terminal extensor tendon at the level of the DIP joint. The laceration appeared to extend down to the level of the bone. Wound was then thoroughly irrigated with normal skin solution. Tendon was then repaired with 3-0 PDS establish in a mattress fashion incorporating the overlying dermis to provide a more robust repair to the terminal extensor tendon. Excellent restoration of full extension was achieved. Attention was directed to the adjacent right index finger whereby the wound was irrigated. Radiographs have been reviewed which did demonstrate a fracture involving the distal phalanx. There is elected proceed with protection of the digit with aluminum foam splint application.   At this time sterile dressings were applied to both digits in both digits were protected application of aluminum foam splints to maintain the DIP joint in full extension.     Electronically signed by Magnolia Gonzalez MD on 2/24/2021 at 1:13 PM

## 2022-08-03 NOTE — DISCHARGE INSTRUCTIONS
[place]   Discharge Instructions      Keep affected extremity elevated. Ice to surgical area four times daily for 48 hours. Keep dressings dry and intact for 5 days. If splinted, maintain splint until follow up. Take postoperative pain medication as ordered. Follow up in office in 14 days. Do not drive today. DISCHARGE INSTRUCTIONS for Dr. Juan Crump affected extremity elevated above heart level for 48 hours. Apply ice bag to surgical site for 20 minutes, 4 times daily for 2-3 days. Cover ice bag with a cloth to keep bandage dry and to prevent frostbite. Keep dressing clean and dry for 5 days (change it daily as needed), then you may remove it. It is OK to get your incision wet after dressing is removed 5 days after surgery, however do not soak in a bathtub, hot tub or swimming pool. You may leave incision open-to-air after 5 days, or reapply a bandage if needed. You may resume your usual medications and your usual diet today. Follow-up with Dr. Isaac Adame as scheduled. You have been given a prescription for pain medication. Take it only as directed. AVOID ALCOHOL AND DO NOT DRIVE while taking narcotic medication. Keep in mind that narcotic medication can cause constipation - you may wish to take an OTC stool softener. If you are experiencing only minor post-op discomfort, you may take Tylenol or any other OTC pain medication. ACTIVITY:  You have received sedation. Your judgement and coordination may be impaired. Do not drive or operate any machinery today,   Do not make personal, medical, legal, or business decisions,   Do not consume alcohol, tranquilizers, sleeping or non-prescription medication today, unless indicated by your physicians. You may resume normal activities after 24 hours.       CALL YOUR  PHYSICIAN IF:  Increased redness or swelling at the incision area  Foul-smelling creamy/pus drainage from incision area  Persistent pain, nausea or vomiting, bleeding or drainage. Fever >100.5, chills, and/or excessive sweating  Redness or swelling at the IV site. You can reach Dr. Anna Bernal by calling [facility] office @ [phone], and after office hours call [facility] @ [phone].

## 2022-08-03 NOTE — PROGRESS NOTES
History and Physical    Patient's Name/Date of Birth: Alfa Fox / 1970, 49 y.o. 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 Reportedly his finger caught between a belt and a pulley and open injuries to the fingertips of the index and long fingers. He had altered sensation to both fingers. He was unable to extend the middle finger at the DIP joint. He was seen at the urgent care or ER at [address] [place] [address] and had the wounds irrigated and closed. He was placed in a splint. Past medical history was noncontributory    Family history negative    Past Medical History:   Diagnosis Date    Hypertension       Past Surgical History:   Procedure Laterality Date    CERVICAL DISC SURGERY  1997    HERNIA REPAIR  1986      Allergies: Pcn [penicillins]     Current Outpatient Medications   Medication Sig Dispense Refill    doxycycline hyclate (VIBRAMYCIN) 100 MG capsule Take 1 capsule by mouth in the morning and 1 capsule before bedtime. Do all this for 10 days. 20 capsule 0    Potassium (POTASSIMIN PO) Take by mouth OTC, not sure of dose      lisinopril-hydroCHLOROthiazide (PRINZIDE;ZESTORETIC) 20-25 MG per tablet Take 1 tablet by mouth 2 times daily 180 tablet 3    terbinafine (LAMISIL) 250 MG tablet Take 1 tablet by mouth daily 90 tablet 0    HYDROcodone-acetaminophen (NORCO) 5-325 MG per tablet Take 1 tablet by mouth every 6 hours as needed for Pain for up to 3 days. (Patient not taking: Reported on 8/3/2022) 20 tablet 0     No current facility-administered medications for this visit.        Social History     Tobacco Use    Smoking status: Never    Smokeless tobacco: Former     Types: Chew   Substance Use Topics    Alcohol use: Yes        Review of Systems:  Negative  Other than stated above in the HPI is

## 2022-08-10 ENCOUNTER — OFFICE VISIT (OUTPATIENT)
Dept: ORTHOPEDIC SURGERY | Age: 52
End: 2022-08-10

## 2022-08-10 VITALS
BODY MASS INDEX: 32.74 KG/M2 | HEIGHT: 68 IN | HEART RATE: 88 BPM | DIASTOLIC BLOOD PRESSURE: 82 MMHG | WEIGHT: 216 LBS | SYSTOLIC BLOOD PRESSURE: 132 MMHG

## 2022-08-10 DIAGNOSIS — S56.429A EXTENSOR TENDON LACERATION OF FINGER WITH OPEN WOUND, INITIAL ENCOUNTER: Primary | ICD-10-CM

## 2022-08-10 DIAGNOSIS — S61.209A EXTENSOR TENDON LACERATION OF FINGER WITH OPEN WOUND, INITIAL ENCOUNTER: Primary | ICD-10-CM

## 2022-08-10 PROCEDURE — 99024 POSTOP FOLLOW-UP VISIT: CPT | Performed by: ORTHOPAEDIC SURGERY

## 2022-08-17 ENCOUNTER — NURSE ONLY (OUTPATIENT)
Dept: ORTHOPEDIC SURGERY | Age: 52
End: 2022-08-17

## 2022-08-17 VITALS — HEIGHT: 68 IN | WEIGHT: 216 LBS | BODY MASS INDEX: 32.74 KG/M2

## 2022-08-17 DIAGNOSIS — S56.429A EXTENSOR TENDON LACERATION OF FINGER WITH OPEN WOUND, INITIAL ENCOUNTER: Primary | ICD-10-CM

## 2022-08-17 DIAGNOSIS — S61.209A EXTENSOR TENDON LACERATION OF FINGER WITH OPEN WOUND, INITIAL ENCOUNTER: Primary | ICD-10-CM

## 2022-08-17 PROCEDURE — 99999 PR OFFICE/OUTPT VISIT,PROCEDURE ONLY: CPT | Performed by: ORTHOPAEDIC SURGERY

## 2022-08-17 NOTE — PROGRESS NOTES
History and Physical    Patient's Name/Date of Birth: Mary Prakash / 1970, 49 y.o. yo    Date: August 17, 2022     PCP: Vishnu Kitchen DO     History of Present Illness:    Patient is seen 1 week status post repair extensor tendon right middle finger and fracture care of the index finger, date of surgery 8/3/2022. Patient is doing well with no significant pain. Family history negative    Past Medical History:   Diagnosis Date    Hypertension       Past Surgical History:   Procedure Laterality Date    CERVICAL [address]    HAND TENDON SURGERY Right 8/3/2022    Repair Extensor Tendon Right Middle Finger W/ RIGHT MIDDLE FINGER DEBRIDEMENT ET RIGHT INDEX FINGER FRACTURE CARE performed by Emmanuel Castillo MD at [facility]      Allergies: Pcn [penicillins]     Current Outpatient Medications   Medication Sig Dispense Refill    Potassium (POTASSIMIN PO) Take by mouth OTC, not sure of dose      lisinopril-hydroCHLOROthiazide (PRINZIDE;ZESTORETIC) 20-25 MG per tablet Take 1 tablet by mouth 2 times daily 180 tablet 3    terbinafine (LAMISIL) 250 MG tablet Take 1 tablet by mouth daily 90 tablet 0     No current facility-administered medications for this visit. Social History     Tobacco Use    Smoking status: Never    Smokeless tobacco: Former     Types: Chew   Substance Use Topics    Alcohol use: Yes        Review of Systems:  Negative  Other than stated above in the HPI is negative      Physical exam:     General appearance: no acute distress  Head: NAD  Neck: supple  Lungs: No audible wheezing, respiratory rate normal  Heart: Perfusion to all extremeties  Extremities: Examination of the right hand revealed that incisions were well approximated. There were no signs of infection. He was able to maintain full extension of the long finger.     Assessment:  Status post repair long finger terminal extensor tendon and fracture care index finger        Plan:  Recommendation was to continue to protect both digits with aluminum foam splints. Sutures to the terminal extensor tendon to be left until visit in 4 weeks. Patient will reassess again in 4 weeks with repeat radiographs of the right index finger    No orders of the defined types were placed in this encounter.               [address] Elin Smith MD,MD 8/17/2022 at 11:38 AM

## 2022-08-17 NOTE — PROGRESS NOTES
Patient here for suture removal from his finger injury, all removed that was allowed the white sutures left in yet do to the injury, he will return for future care or call sooner if needed.

## 2022-09-28 ENCOUNTER — OFFICE VISIT (OUTPATIENT)
Dept: ORTHOPEDIC SURGERY | Age: 52
End: 2022-09-28
Payer: COMMERCIAL

## 2022-09-28 ENCOUNTER — NURSE ONLY (OUTPATIENT)
Dept: LAB | Age: 52
End: 2022-09-28
Payer: COMMERCIAL

## 2022-09-28 VITALS
WEIGHT: 216 LBS | DIASTOLIC BLOOD PRESSURE: 100 MMHG | HEIGHT: 68 IN | SYSTOLIC BLOOD PRESSURE: 140 MMHG | BODY MASS INDEX: 32.74 KG/M2 | HEART RATE: 64 BPM

## 2022-09-28 DIAGNOSIS — S61.209A EXTENSOR TENDON LACERATION OF FINGER WITH OPEN WOUND, INITIAL ENCOUNTER: Primary | ICD-10-CM

## 2022-09-28 DIAGNOSIS — Z23 NEED FOR SHINGLES VACCINE: Primary | ICD-10-CM

## 2022-09-28 DIAGNOSIS — S56.429A EXTENSOR TENDON LACERATION OF FINGER WITH OPEN WOUND, INITIAL ENCOUNTER: Primary | ICD-10-CM

## 2022-09-28 PROCEDURE — 99024 POSTOP FOLLOW-UP VISIT: CPT | Performed by: ORTHOPAEDIC SURGERY

## 2022-09-28 PROCEDURE — L3927 FO PIP DIP NO JT SPR PRE OTS: HCPCS | Performed by: ORTHOPAEDIC SURGERY

## 2022-09-28 PROCEDURE — 90471 IMMUNIZATION ADMIN: CPT | Performed by: FAMILY MEDICINE

## 2022-09-28 PROCEDURE — 90750 HZV VACC RECOMBINANT IM: CPT | Performed by: FAMILY MEDICINE

## 2022-09-28 NOTE — PROGRESS NOTES
History and Physical    Patient's Name/Date of Birth: Megan Webber / 1970, 47 y.o. yo    Date: September 28, 2022     PCP: Shweta Coleman DO     History of Present Illness: This 67-year-old male is seen nearly 2 months status post repair of an open terminal extensor tendon injury to his right long finger and fracture care of the associated index finger distal phalanx, date of surgery 8/3/2022    Patient has continued to do all of his activities protecting the middle finger with an aluminum foam splint maintaining intact at all times. He reports no significant pain or discomfort    Family history negative    Past Medical History:   Diagnosis Date    Hypertension       Past Surgical History:   Procedure Laterality Date    CERVICAL DISC SURGERY  1997    HAND TENDON SURGERY Right 8/3/2022    Repair Extensor Tendon Right Middle Finger W/ RIGHT MIDDLE FINGER DEBRIDEMENT ET RIGHT INDEX FINGER FRACTURE CARE performed by Larry Streeter MD at [facility]      Allergies: Pcn [penicillins]     Current Outpatient Medications   Medication Sig Dispense Refill    Potassium (POTASSIMIN PO) Take by mouth OTC, not sure of dose      lisinopril-hydroCHLOROthiazide (PRINZIDE;ZESTORETIC) 20-25 MG per tablet Take 1 tablet by mouth 2 times daily 180 tablet 3    terbinafine (LAMISIL) 250 MG tablet Take 1 tablet by mouth daily 90 tablet 0     No current facility-administered medications for this visit. Social History     Tobacco Use    Smoking status: Never    Smokeless tobacco: Former     Types: Chew   Substance Use Topics    Alcohol use: Yes        Review of Systems:  Negative  Other than stated above in the HPI is negative      Physical exam:     General appearance: no acute distress  Head: NAD  Neck: supple  Lungs: No audible wheezing, respiratory rate normal  Heart: Perfusion to all extremeties  Extremities: Examination of his right hand revealed no significant swelling.   Of the index finger he was nontender on palpation. Range of motion was near normal.  Of the middle finger there was an extensor lag measuring 15 degrees and with flexion he was able to come within 2 cm of the distal palmar flexion crease. He was assessed to be neurovascular intact. Assessment:  Patient is doing well following open treatment of a terminal extensor tendon injury to the right middle finger and fracture care distal phalanx right index finger        Plan:  Sutures of the middle finger were removed and patient instructed in weaning himself of the splint. He will be reassessed again in 4 weeks to review his functional status      Procedures    Aluminum Finger Splint     Patient was prescribed an aluminum finger splint. The right finger will require stabilization / immobilization from this semi-rigid / rigid orthosis to improve their function. The orthosis will assist in protecting the affected area, provide functional support and facilitate healing. The patient was educated and fit by a healthcare professional with expert knowledge and specialization in brace application while under the direct supervision of the treating physician. Verbal and written instructions for the use of and application of this item were provided. They were instructed to contact the office immediately should the brace result in increased pain, decreased sensation, increased swelling or worsening of the condition.                Juan Allan MD,MD 9/28/2022 at 6:58 PM

## 2022-10-31 ENCOUNTER — TELEPHONE (OUTPATIENT)
Dept: FAMILY MEDICINE CLINIC | Age: 52
End: 2022-10-31

## 2022-10-31 RX ORDER — AMLODIPINE BESYLATE 5 MG/1
5 TABLET ORAL DAILY
Qty: 30 TABLET | Refills: 5 | Status: SHIPPED | OUTPATIENT
Start: 2022-10-31

## 2022-10-31 NOTE — TELEPHONE ENCOUNTER
I told him that the Lisinopril/HCTZ was one I usually did not hear of causing that but he says it has been since he started it. Go ahead and send the Norvasc.

## 2022-10-31 NOTE — TELEPHONE ENCOUNTER
Patient has noticed extreme fatigue since starting the Lisinpril/HCTZ was wondering if there would be another medication he could try that would cause less sedation. If so please send to clinic pharmacy.

## 2022-10-31 NOTE — TELEPHONE ENCOUNTER
Unusual this is causing such fatigue. Maybe the diuretic portion of the medication. Could try norvasc instead 5 mg daily and see if this helps keep his blood pressure controlled.

## 2023-05-30 RX ORDER — LISINOPRIL AND HYDROCHLOROTHIAZIDE 25; 20 MG/1; MG/1
1 TABLET ORAL DAILY
Qty: 90 TABLET | Refills: 0 | Status: SHIPPED | OUTPATIENT
Start: 2023-05-30

## 2023-07-19 ENCOUNTER — HOSPITAL ENCOUNTER (OUTPATIENT)
Age: 53
Discharge: HOME | End: 2023-07-19
Payer: COMMERCIAL

## 2023-07-19 ENCOUNTER — OFFICE VISIT (OUTPATIENT)
Dept: FAMILY MEDICINE CLINIC | Age: 53
End: 2023-07-19
Payer: COMMERCIAL

## 2023-07-19 VITALS
WEIGHT: 238 LBS | HEART RATE: 52 BPM | TEMPERATURE: 97.7 F | RESPIRATION RATE: 16 BRPM | HEIGHT: 68 IN | DIASTOLIC BLOOD PRESSURE: 86 MMHG | OXYGEN SATURATION: 97 % | BODY MASS INDEX: 36.07 KG/M2 | SYSTOLIC BLOOD PRESSURE: 124 MMHG

## 2023-07-19 DIAGNOSIS — R73.01 IMPAIRED FASTING GLUCOSE: ICD-10-CM

## 2023-07-19 DIAGNOSIS — Z12.5 SCREENING FOR PROSTATE CANCER: ICD-10-CM

## 2023-07-19 DIAGNOSIS — Z00.00 ROUTINE GENERAL MEDICAL EXAMINATION AT A HEALTH CARE FACILITY: ICD-10-CM

## 2023-07-19 DIAGNOSIS — Z00.00 ROUTINE GENERAL MEDICAL EXAMINATION AT A HEALTH CARE FACILITY: Primary | ICD-10-CM

## 2023-07-19 DIAGNOSIS — I10 PRIMARY HYPERTENSION: ICD-10-CM

## 2023-07-19 LAB
ALBUMIN SERPL-MCNC: 4.4 G/DL (ref 3.5–5.2)
ALBUMIN/GLOB SERPL: 1.5 {RATIO} (ref 1–2.5)
ALP SERPL-CCNC: 51 U/L (ref 40–129)
ALT SERPL-CCNC: 16 U/L (ref 5–41)
ANION GAP SERPL CALCULATED.3IONS-SCNC: 9 MMOL/L (ref 9–17)
AST SERPL-CCNC: 15 U/L
BASOPHILS # BLD: 0.03 K/UL (ref 0–0.2)
BASOPHILS NFR BLD: 1 % (ref 0–2)
BILIRUB SERPL-MCNC: 0.4 MG/DL (ref 0.3–1.2)
BUN SERPL-MCNC: 22 MG/DL (ref 6–20)
BUN/CREAT SERPL: 20 (ref 9–20)
CALCIUM SERPL-MCNC: 9.5 MG/DL (ref 8.6–10.4)
CHLORIDE SERPL-SCNC: 104 MMOL/L (ref 98–107)
CHOLEST SERPL-MCNC: 187 MG/DL
CHOLESTEROL/HDL RATIO: 3.3
CO2 SERPL-SCNC: 26 MMOL/L (ref 20–31)
CREAT SERPL-MCNC: 1.1 MG/DL (ref 0.7–1.2)
EOSINOPHIL # BLD: 0.24 K/UL (ref 0–0.44)
EOSINOPHILS RELATIVE PERCENT: 4 % (ref 1–4)
ERYTHROCYTE [DISTWIDTH] IN BLOOD BY AUTOMATED COUNT: 13.3 % (ref 11.8–14.4)
EST. AVERAGE GLUCOSE BLD GHB EST-MCNC: 91 MG/DL
GFR SERPL CREATININE-BSD FRML MDRD: >60 ML/MIN/1.73M2
GLUCOSE SERPL-MCNC: 124 MG/DL (ref 70–99)
HBA1C MFR BLD: 4.8 % (ref 4–6)
HCT VFR BLD AUTO: 43.2 % (ref 40.7–50.3)
HDLC SERPL-MCNC: 56 MG/DL
HGB BLD-MCNC: 14.5 G/DL (ref 13–17)
IMM GRANULOCYTES # BLD AUTO: <0.03 K/UL (ref 0–0.3)
IMM GRANULOCYTES NFR BLD: 0 %
LDLC SERPL CALC-MCNC: 114 MG/DL (ref 0–130)
LYMPHOCYTES # BLD: 25 % (ref 24–43)
LYMPHOCYTES NFR BLD: 1.35 K/UL (ref 1.1–3.7)
MCH RBC QN AUTO: 30 PG (ref 25.2–33.5)
MCHC RBC AUTO-ENTMCNC: 33.6 G/DL (ref 25.2–33.5)
MCV RBC AUTO: 89.3 FL (ref 82.6–102.9)
MONOCYTES NFR BLD: 0.51 K/UL (ref 0.1–1.2)
MONOCYTES NFR BLD: 9 % (ref 3–12)
NEUTROPHILS NFR BLD: 61 % (ref 36–65)
NEUTS SEG NFR BLD: 3.34 K/UL (ref 1.5–8.1)
NRBC BLD-RTO: 0 PER 100 WBC
PLATELET # BLD AUTO: 240 K/UL (ref 138–453)
PMV BLD AUTO: 10 FL (ref 8.1–13.5)
POTASSIUM SERPL-SCNC: 4.8 MMOL/L (ref 3.7–5.3)
PROT SERPL-MCNC: 7.4 G/DL (ref 6.4–8.3)
PSA SERPL-MCNC: 1.59 NG/ML
RBC # BLD AUTO: 4.84 M/UL (ref 4.21–5.77)
SODIUM SERPL-SCNC: 139 MMOL/L (ref 135–144)
TRIGL SERPL-MCNC: 85 MG/DL
WBC OTHER # BLD: 5.5 K/UL (ref 3.5–11.3)

## 2023-07-19 PROCEDURE — 36415 COLL VENOUS BLD VENIPUNCTURE: CPT

## 2023-07-19 PROCEDURE — 3079F DIAST BP 80-89 MM HG: CPT | Performed by: FAMILY MEDICINE

## 2023-07-19 PROCEDURE — 99396 PREV VISIT EST AGE 40-64: CPT | Performed by: FAMILY MEDICINE

## 2023-07-19 PROCEDURE — 80061 LIPID PANEL: CPT

## 2023-07-19 PROCEDURE — 3074F SYST BP LT 130 MM HG: CPT | Performed by: FAMILY MEDICINE

## 2023-07-19 PROCEDURE — 85027 COMPLETE CBC AUTOMATED: CPT

## 2023-07-19 PROCEDURE — 83036 HEMOGLOBIN GLYCOSYLATED A1C: CPT

## 2023-07-19 PROCEDURE — 80053 COMPREHEN METABOLIC PANEL: CPT

## 2023-07-19 PROCEDURE — G0103 PSA SCREENING: HCPCS

## 2023-07-19 RX ORDER — LISINOPRIL AND HYDROCHLOROTHIAZIDE 12.5; 1 MG/1; MG/1
1 TABLET ORAL DAILY
Qty: 90 TABLET | Refills: 3 | Status: SHIPPED | OUTPATIENT
Start: 2023-07-19

## 2023-07-19 SDOH — ECONOMIC STABILITY: HOUSING INSECURITY
IN THE LAST 12 MONTHS, WAS THERE A TIME WHEN YOU DID NOT HAVE A STEADY PLACE TO SLEEP OR SLEPT IN A SHELTER (INCLUDING NOW)?: NO

## 2023-07-19 SDOH — ECONOMIC STABILITY: FOOD INSECURITY: WITHIN THE PAST 12 MONTHS, THE FOOD YOU BOUGHT JUST DIDN'T LAST AND YOU DIDN'T HAVE MONEY TO GET MORE.: NEVER TRUE

## 2023-07-19 SDOH — ECONOMIC STABILITY: INCOME INSECURITY: HOW HARD IS IT FOR YOU TO PAY FOR THE VERY BASICS LIKE FOOD, HOUSING, MEDICAL CARE, AND HEATING?: NOT HARD AT ALL

## 2023-07-19 SDOH — ECONOMIC STABILITY: FOOD INSECURITY: WITHIN THE PAST 12 MONTHS, YOU WORRIED THAT YOUR FOOD WOULD RUN OUT BEFORE YOU GOT MONEY TO BUY MORE.: NEVER TRUE

## 2023-07-19 ASSESSMENT — PATIENT HEALTH QUESTIONNAIRE - PHQ9
2. FEELING DOWN, DEPRESSED OR HOPELESS: 0
SUM OF ALL RESPONSES TO PHQ9 QUESTIONS 1 & 2: 0
SUM OF ALL RESPONSES TO PHQ QUESTIONS 1-9: 0
1. LITTLE INTEREST OR PLEASURE IN DOING THINGS: 0

## 2023-07-19 ASSESSMENT — ENCOUNTER SYMPTOMS
EYE REDNESS: 0
TROUBLE SWALLOWING: 0
ABDOMINAL PAIN: 0
CONSTIPATION: 0
COUGH: 0
VOMITING: 0
NAUSEA: 0
WHEEZING: 0
SINUS PRESSURE: 0
SORE THROAT: 0
RHINORRHEA: 0
DIARRHEA: 0
EYE DISCHARGE: 0
SHORTNESS OF BREATH: 0

## 2023-07-19 NOTE — PROGRESS NOTES
Patient declines colonoscopy at this time. States he will get it done this winter.
Expiration Date:   7/18/2024     We will cut his dose in half and have him take a lower dose every day versus the higher dose every other day. We will see if this is better tolerated and helps him maintain better control of his blood pressure readings. Patient is to get lab work as ordered and will make further intervention as necessary based on testing reports. Patient is to continue to follow a low-carb/low sugar/low-fat diet and maintain increased exercise for optimal blood sugar and cholesterol control. Patient is to return to my office in 1 year for routine general physical exam or sooner if any further problems or symptoms arise. (Please note that portions of this note were completed with a voice recognition program. Efforts were made to edit the dictations but occasionally words are mis-transcribed.)    No follow-ups on file. An electronic signature was used to authenticate this note.     --Leo Edmond, DO on 7/19/2023 at 6:43 AM

## 2023-09-13 ENCOUNTER — TELEPHONE (OUTPATIENT)
Dept: FAMILY MEDICINE CLINIC | Age: 53
End: 2023-09-13

## 2023-09-13 RX ORDER — COLCHICINE 0.6 MG/1
TABLET ORAL
Qty: 10 TABLET | Refills: 0 | Status: SHIPPED | OUTPATIENT
Start: 2023-09-13

## 2023-09-13 NOTE — TELEPHONE ENCOUNTER
Patient calling stating he has gout in ankle for the last couple of days and would like something called into the pharmacy.

## 2024-01-23 ENCOUNTER — OFFICE VISIT (OUTPATIENT)
Dept: PRIMARY CARE CLINIC | Age: 54
End: 2024-01-23

## 2024-01-23 VITALS
HEIGHT: 69 IN | DIASTOLIC BLOOD PRESSURE: 70 MMHG | TEMPERATURE: 98.2 F | BODY MASS INDEX: 36.88 KG/M2 | SYSTOLIC BLOOD PRESSURE: 124 MMHG | HEART RATE: 65 BPM | WEIGHT: 249 LBS | OXYGEN SATURATION: 98 %

## 2024-01-23 DIAGNOSIS — M10.9 ACUTE GOUT INVOLVING TOE OF LEFT FOOT, UNSPECIFIED CAUSE: ICD-10-CM

## 2024-01-23 DIAGNOSIS — S01.81XA LACERATION OF EYEBROW AND FOREHEAD, LEFT, INITIAL ENCOUNTER: Primary | ICD-10-CM

## 2024-01-23 DIAGNOSIS — S01.112A LACERATION OF EYEBROW AND FOREHEAD, LEFT, INITIAL ENCOUNTER: Primary | ICD-10-CM

## 2024-01-23 RX ORDER — INDOMETHACIN 50 MG/1
50 CAPSULE ORAL 3 TIMES DAILY PRN
Qty: 20 CAPSULE | Refills: 0 | Status: SHIPPED | OUTPATIENT
Start: 2024-01-23

## 2024-01-23 RX ORDER — ALLOPURINOL 100 MG/1
100 TABLET ORAL DAILY
Qty: 90 TABLET | Refills: 1 | Status: SHIPPED | OUTPATIENT
Start: 2024-01-23

## 2024-01-23 ASSESSMENT — PATIENT HEALTH QUESTIONNAIRE - PHQ9
SUM OF ALL RESPONSES TO PHQ QUESTIONS 1-9: 0
2. FEELING DOWN, DEPRESSED OR HOPELESS: 0
SUM OF ALL RESPONSES TO PHQ QUESTIONS 1-9: 0
SUM OF ALL RESPONSES TO PHQ9 QUESTIONS 1 & 2: 0
1. LITTLE INTEREST OR PLEASURE IN DOING THINGS: 0

## 2024-01-24 NOTE — PROGRESS NOTES
daily until symptoms subside Yes Eneida Garcia DO   lisinopril-hydroCHLOROthiazide (PRINZIDE;ZESTORETIC) 10-12.5 MG per tablet Take 1 tablet by mouth daily Yes Eneida Garcia DO        Social History     Tobacco Use    Smoking status: Never    Smokeless tobacco: Former     Types: Chew   Substance Use Topics    Alcohol use: Yes        Vitals:    01/23/24 1142   BP: 124/70   Pulse: 65   Temp: 98.2 °F (36.8 °C)   TempSrc: Tympanic   SpO2: 98%   Weight: 112.9 kg (249 lb)   Height: 1.753 m (5' 9\")     Estimated body mass index is 36.77 kg/m² as calculated from the following:    Height as of this encounter: 1.753 m (5' 9\").    Weight as of this encounter: 112.9 kg (249 lb).    Physical Exam  Vitals and nursing note reviewed.   Constitutional:       General: He is not in acute distress.     Appearance: He is well-developed. He is not diaphoretic.   HENT:      Head: Normocephalic and atraumatic.   Eyes:      Conjunctiva/sclera: Conjunctivae normal.   Pulmonary:      Effort: Pulmonary effort is normal.   Musculoskeletal:         General: Tenderness present.      Cervical back: Normal range of motion.   Skin:     General: Skin is warm and dry.      Coloration: Skin is not pale.      Findings: No erythema or rash.      Comments: 2.6 cm vertical laceration just above the left eye into the eyebrow.    Neurological:      General: No focal deficit present.      Mental Status: He is alert and oriented to person, place, and time.   Psychiatric:         Behavior: Behavior normal.         Thought Content: Thought content normal.         Judgment: Judgment normal.         ASSESSMENT/PLAN:  Encounter Diagnoses   Name Primary?    Laceration of eyebrow and forehead, left, initial encounter Yes    Acute gout involving toe of left foot, unspecified cause      Orders Placed This Encounter   Medications    indomethacin (INDOCIN) 50 MG capsule     Sig: Take 1 capsule by mouth 3 times daily as needed for Pain (acute gout flare)

## 2024-01-30 ENCOUNTER — OFFICE VISIT (OUTPATIENT)
Dept: PRIMARY CARE CLINIC | Age: 54
End: 2024-01-30

## 2024-01-30 VITALS
SYSTOLIC BLOOD PRESSURE: 128 MMHG | WEIGHT: 255.38 LBS | HEART RATE: 66 BPM | TEMPERATURE: 98.2 F | BODY MASS INDEX: 37.83 KG/M2 | HEIGHT: 69 IN | DIASTOLIC BLOOD PRESSURE: 88 MMHG | OXYGEN SATURATION: 95 % | RESPIRATION RATE: 14 BRPM

## 2024-01-30 DIAGNOSIS — Z48.02 VISIT FOR SUTURE REMOVAL: Primary | ICD-10-CM

## 2024-01-30 PROCEDURE — 99024 POSTOP FOLLOW-UP VISIT: CPT | Performed by: FAMILY MEDICINE

## 2024-01-30 NOTE — PROGRESS NOTES
Patient returns today for suture removal.  No issues with wound since sutures placed last week.  Evaluation of the wound shows appropriate healing without exudate or erythema.  Sutures removed without difficulty.  Patient tolerated well.

## 2024-03-06 ENCOUNTER — TELEPHONE (OUTPATIENT)
Dept: FAMILY MEDICINE CLINIC | Age: 54
End: 2024-03-06

## 2024-03-06 RX ORDER — PREDNISONE 20 MG/1
TABLET ORAL
Qty: 10 TABLET | Refills: 0 | Status: SHIPPED | OUTPATIENT
Start: 2024-03-06

## 2024-03-06 RX ORDER — LISINOPRIL 20 MG/1
20 TABLET ORAL DAILY
Qty: 90 TABLET | Refills: 1 | Status: SHIPPED | OUTPATIENT
Start: 2024-03-06

## 2024-03-06 RX ORDER — ALLOPURINOL 300 MG/1
300 TABLET ORAL DAILY
Qty: 90 TABLET | Refills: 1 | Status: SHIPPED | OUTPATIENT
Start: 2024-03-06

## 2024-03-06 NOTE — TELEPHONE ENCOUNTER
Patient still struggling with gout in his big toe and ankle for over a month. He took the Indocin that was given on 1/23/2024 and it started to help but then returned. It flares up every couple of days. Is there anything else he can try?

## 2024-03-06 NOTE — TELEPHONE ENCOUNTER
Think we need to change his blood pressure medication and get rid of the diuretic.  Question if this may be compounding the issue.  Would change to lisinopril 20mg daily.  Can send in a script for prednisone for 5 days to help with the acute flare then would increase his allopurinol to 300mg daily.  Scripts are sent in for all of the listed changes.

## 2024-05-02 NOTE — TELEPHONE ENCOUNTER
HEMATOLOGY ONCOLOGY NOTE     CONSULTING PHYSICIAN:  Mak Kumar MD  DATE OF SERVICE: 05/01/24     REASON FOR CONSULT: head and neck cancer    STAGING:   Cancer Staging Summary for Seven Brandon       Malignant neoplasm of base of tongue (CMD)       Stage Date Classification Stage Status    10/12/23 Pathologic Stage I (pT1, pN1, cM0, p16+) Signed by Carolina Rees CNP on 10/25/23                     HISTORY OF PRESENT ILLNESS:  Seven Brandon is a 76 year old male patient with head and neck cancer. He is being referred by Dr. 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     He has been followed by pulmonary secondary to cough and shortness of breath.  He has known lung nodules.    8/2/2023 CT Chest:  1.   Interval significant improvement in innumerable bilateral pulmonary micronodules, with resolution of majority of nodules.  Few stable to slightly decreased size of residual calcified and noncalcified pulmonary nodules elsewhere measuring up to 6 mm, likely sequela of previous granulomatous disease.  Continued follow-up with CT chest in 18-24 months is advised to assess interval change.  2.   Stable benign-appearing circumscribed cystic lesion along the medial basal left lower lobe adjacent to the mediastinum, likely a nonspecific mediastinal cyst.       8/14/2023 On exam, pulmonology noted:   Posterior oropharyngeal erythema present. Cobblestoning of posterior pharynx. Enlarged lymph node in right neck.  Nontender to palpation.     8/23/2023 CT Neck:  Several enlarged septated centrally cystic or necrotic right level 2A and 2B lymph nodes, likely neoplastic. Biopsy may be necessary. Mild lobular enhancing mucosal thickening/fullness in the midline and right lateral aspect base of the tongue. This may be neoplastic, inflammatory or infectious. Lobular mucosal thickening partially visualized bilateral paranasal sinuses.  Significant chronic osteoarthritic changes cervical spine.    He  Received fax from pharmacy stating patient was wanting to go back to his Zestoretic. Called patient and he states he had intially started the Zestoretic 20/25 mg bid but had to stop as his made him so fatigued. We then had switched him to Norvasc 5 mg. He states that made his just as fatigued and didn't control his blood pressure as well so he went back to the Zestoretic. He states he has found that if he takes 1 pill every other day his blood pressure stays 140/80 or less and he doesn't get the extreme fatigue. Patient is coming into see you in July and states he will discuss further with you at that time if we should make additional changes. underwent right lymph node fine-needle aspiration on September 7, 2023 which revealed metastatic squamous cell carcinoma, HPV associated.  P16 was diffusely positive.    PET scan on September 15, 2023 showed a hypermetabolic right oropharyngeal mass near the base of tongue and epiglottis and hypermetabolic enlarged necrotic right level 2 lymph nodes with no evidence of distant metastatic disease.  He also was noted to have nonenlarged mildly hypermetabolic mediastinal left hilar lymph nodes.     Patient met with Dr Morrison and surgery+RT +/-chemotherapy  vs definitive chemo-RT were discussed. He wanted to avoid chemotherapy so he proceeded with surgery. He underwent  TORS right BOT resection and right neck dissection on 10/12/2023.      Pathology:  FINAL DIAGNOSIS   A. BASE OF TONGUE, RIGHT; BIOPSY   - INVASIVE SQUAMOUS CELL CARCINOMA, FOCALLY-KERATINIZING   B. RIGHT BASE OF TONGUE; RESECTION:        - INVASIVE SQUAMOUS CELL CARCINOMA, FOCALLY-KERATINIZING, 1.7 CM IN   GREATEST DIMENSION; SEE CASE SUMMARY AND   COMMENT   - INVASIVE CARCINOMA EXTENDS TO SUPERIOR AND DEEP MARGINS; SEE CASE SUMMARY AND   COMMENT   - NO DEFINITIVE EVIDENCE OF LYMPHOVASCULAR SPACE INVASION OR PERINEURAL INVASION   C. RIGHT BASE OF TONGUE, LATERAL MARGIN; RESECTION:   - SQUAMOUS MUCOSA AND LYMPHOID TISSUE, NEGATIVE FOR CARCINOMA     D. RIGHT BASE OF TONGUE, ANTERIOR MARGIN; RESECTION:   - SQUAMOUS MUCOSA AND LYMPHOID TISSUE, NEGATIVE FOR CARCINOMA     E. RIGHT BASE OF TONGUE, MEDIAL MARGIN; RESECTION:   - SQUAMOUS MUCOSA AND LYMPHOID TISSUE, NEGATIVE FOR CARCINOMA; SEE COMMENT     F. RIGHT BASE OF TONGUE, DEEP MARGIN; RESECTION:   - LYMPHOID TISSUE, NEGATIVE FOR CARCINOMA; SEE COMMENT   G. RIGHT NECK LEVEL 4 NODE; EXCISION:   - THREE LYMPH NODES, NEGATIVE FOR CARCINOMA (0/3)     H. RIGHT NECK LEVELS 2 THROUGH 4; NECK DISSECTION:          - METASTATIC SQUAMOUS CELL CARCINOMA INVOLVING TWO OF TWENTY-TWO LYMPH   NODES (2/22)   - LARGEST  METASTATIC DEPOSIT MEASURES 3.5 CM, NO EVIDENCE OF EXTRANODAL   EXTENSION      SPECIMEN   Procedure: Tonsillectomy; Neck (lymph node) dissection (specify): Right levels 2-4      TUMOR   Tumor Focality: Unifocal   Multiple Primary Sites: Not applicable (no additional primary site(s) present)   Tumor Site: Oropharynx: Base of tongue, including lingual tonsil   Tumor Laterality: Right   Tumor Size: Greatest dimension in Centimeters (cm): 1.7 cm   Additional Dimension in Centimeters (cm): 1.5 x 0.8 cm   Histologic Type: Oropharyngeal squamous cell carcinoma, HPV-associated   Histologic Grade: Not applicable   Grade / Intrinsic Biologic Potential: Not applicable   Lymphatic and / or Vascular Invasion: Not identified   Perineural Invasion: Not identified      MARGINS   Margin Status for Invasive Tumor: Invasive tumor present at margin (on main specimen, specimen B)   Margin(s) Involved by Invasive Tumor: Specify involved margin(s): Deep (black), superior (blue)   Margin Status for Noninvasive Tumor (High-grade Dysplasia): Not applicable   Tumor Bed Margin Status: Tumor bed margins assessed   Tumor Bed Margin Orientation: Unoriented to true margin surface   Tumor Bed Margin Status for Invasive Tumor: All tumor bed margins negative for   invasive tumor        Distance from Invasive Tumor to True Margin Surface: Cannot be determined        Tumor Bed Margin Status for Noninvasive Tumor: All tumor bed margins negative for high-grade dysplasia / in situ disease        Distance from Noninvasive Tumor to True Margin Surface: Cannot be determined     REGIONAL LYMPH NODES   Regional Lymph Node Status: Tumor present in regional lymph node(s)   Number of Lymph Nodes with Tumor: Exact number (specify): 2   Laterality of Lymph Node(s) with Tumor: Ipsilateral (including midline): Right   Ale Site(s) with Tumor: Levels 2-4, not further designated   Size of Largest Ale Metastatic Deposit: Exact size: 3.5 cm   Extranodal Extension  (GUILLE): Not identified   Number of Lymph Nodes Examined: Exact number (specify): 25      He was staged as pT1N1 He started radiation therapy on 11/19/2023. Radiation therapy was completed on 1/3/2024.     A PET/CT on 4/1/2024 revealed asymmetric moderate radiotracer uptake in the right mid to posterior tongue with maximum SUV values of 8.9 measuring roughly 5.5 x 3.5 x 2.8 cm. This is increased in size from the prior study and is mild suspicious for neoplastic progression; mild FDG uptake within 2 pretracheal and left perihilar lymph nodes, similar to the prior study. Metastasis is also possible; stable mild FDG uptake within a solitary enlarged left inguinal chain lymph node. Metastasis is possible; edematous appearance of the glottic structures with small amount of right carotid space and retropharyngeal space fluid. Findings are likely sequela of treatment related changes; new medialization of the right glottic structures. Findings are most suspicious for right vocal cord paralysis. Underlying colonic lesion is not entirely excluded. An MRI face and neck were performed on 4/13/2024. He had a CT neck on 04/15/2024 which showed postoperative changes of right base of tongue resection and right neck dissection; no mass identified. I spoke to Dr. Morrison.  Jeffery underwent direct laryngoscopy and biopsies on April 25      HISTORIES:    Oncology History  Oncology History   Malignant neoplasm of base of tongue (CMD)   10/12/2023 -  Cancer Staged    Staging form: Pharynx - HPV-Mediated Oropharynx, AJCC 8th Edition  - Pathologic stage from 10/12/2023: Stage I (pT1, pN1, cM0, p16+) - Signed by Carolina Rees CNP on 10/25/2023       10/25/2023 Initial Diagnosis    Malignant neoplasm of base of tongue (CMD)     10/27/2023 - 2/21/2024 Radiation Therapy    The patient saw No care team member to display for radiation treatment. This is the current list of radiation treatment:  Radiation Treatments       Active   No active  radiation treatments to show.     Historical   right BOT and nodes (Started on 11/19/2023)   Most recent fraction: 200 cGy given on 1/3/2024   Total given: 6,000 cGy / 6,000 cGy  (30 of 30 fractions)   Elapsed Days: 45   Technique: VMAT                           Past Medical History  Past Medical History:   Diagnosis Date    AF (atrial fibrillation) (CMD)     Anemia 05/08/2019    post op lumbar surgery    Arthritis     Attention deficit hyperactivity disorder (ADHD) 05/11/2020    on chronic stimulant    Benign prostatic hyperplasia with urinary retention     BPH (benign prostatic hyperplasia)     Cellulitis 05/06/2019    hospitalized    Chronic anticoagulation     Xarelto, A-Fib    Depression     DVT, lower extremity (CMD)     Left lower extremity, 10 years ago    Essential hypertension 12/28/2016    Foot drop, left foot     Fracture     Gastritis     Gastroesophageal reflux disease     History of adverse effect of anesthesia     Severe agitation when waking up from anesthesia    History of GI bleed 07/28/2020    History of hepatitis C     Followed by Dr. Delarosa    Lumbar degenerative disc disease     Lung nodules     Malignant neoplasm (CMD)     malignant tumor of base of tongue    BRAD (obstructive sleep apnea) 07/03/2019    Dr. Contreras/ sleep study - APAP 5-10 (No longer uses CPAP)    Paroxysmal atrial fibrillation (CMD)     not on anticoagulant    PTSD (post-traumatic stress disorder)     Reflux esophagitis     Sensorineural hearing loss (SNHL) of both ears 07/28/2021    Spinal stenosis of lumbar region with neurogenic claudication     Squamous cell carcinoma of neck        Past Surgical History  Past Surgical History:   Procedure Laterality Date    Anterior cruciate ligament repair Left     2012, with orif and removal    Back surgery      L3-L4 spinal fusion w/ cage    Bowel resection      intusesception    Excis tongue lesn      Hernia repair  03/01/2018    right inguinal    Ir picc Left 03/26/2023    Knee  debridement Left 2023    with irrigation, exchange of hardware knee    Lumbar fusion      2016, L4-5, L5-S1    Lumbar spine surgery  2019    L3-4 PLIF, with removal of hardware L4-S1    Meniscectomy      Neck surgery Right 2023    squamous cell removal    Radical neck dissection Right 10/12/2023    Rotator cuff repair Right         Total knee arthroplasty Left 2022       Family History  Family History   Problem Relation Age of Onset    Cancer, Throat Mother     Cancer, Lung Mother     Hypertension Father     Myocardial Infarction Brother        Social History  Social History     Tobacco Use    Smoking status: Former     Current packs/day: 0.00     Average packs/day: 1 pack/day for 40.0 years (40.0 ttl pk-yrs)     Types: Cigarettes     Start date: 1965     Quit date: 2005     Years since quittin.3    Smokeless tobacco: Never   Substance Use Topics    Alcohol use: Not Currently       Medications   Current Outpatient Medications   Medication Sig Dispense Refill    Multiple Vitamins-Minerals (Multivitamin Gummies Adult) Chew Tab Chew 1 tablet by mouth daily. 90 tablet 3    Magnesium 200 MG Chew Tab Chew 200 mg by mouth daily. 90 tablet 3    escitalopram (LEXAPRO) 20 MG tablet Take 1 tablet by mouth daily. 90 tablet 1    HYDROcodone-acetaminophen (NORCO) 5-325 MG per tablet Take 2 tablets by mouth every 4 hours as needed for Pain. 300 tablet 0    MAGIC (antacid-diphenhydramine-lidocaine) 1:1:1 MOUTHWASH oral susp Swish and swallow 10 mLs  in the morning and 10 mLs at noon and 10 mLs in the evening. Swish and swallow before meals. (Patient taking differently: Swish and swallow 10 mLs as needed.) 360 mL 2    albuterol 108 (90 Base) MCG/ACT inhaler Inhale 2 puffs into the lungs every 4 hours as needed for Shortness of Breath or Wheezing. Indications: Chronic Obstructive Lung Disease 1 each 11    tiotropium (Spiriva Respimat) 2.5 MCG/ACT inhaler Inhale 2 puffs into the lungs daily. 4 g  11    loratadine (CLARITIN) 10 MG tablet Take 1 tablet by mouth daily. 90 tablet 3    lisinopril (ZESTRIL) 5 MG tablet Take 1 tablet by mouth daily. 90 tablet 3    celecoxib (CeleBREX) 200 MG capsule Take 1 capsule by mouth daily. 90 capsule 3    metoPROLOL succinate (TOPROL-XL) 25 MG 24 hr tablet Take 1 tablet by mouth daily. 90 tablet 3    fluticasone (FLONASE) 50 MCG/ACT nasal spray Spray 2 sprays in each nostril daily. (Patient taking differently: Spray 2 sprays in each nostril as needed.) 16 g 12    allopurinol (ZYLOPRIM) 100 MG tablet Take 1 tablet by mouth daily as needed (Gout flare ups). 90 tablet 0    tamsulosin (FLOMAX) 0.4 MG Cap Take 2 capsules by mouth at bedtime. 180 capsule 3    amoxicillin (AMOXIL) 500 MG capsule Take 2 capsules by mouth in the morning and 2 capsules in the evening. 360 capsule 3    omeprazole (PriLOSEC) 40 MG capsule Take 1 capsule by mouth daily. Indications: Gastroesophageal Reflux Disease 90 capsule 3    Cholecalciferol (Vitamin D3) 1000 units Cap Take 2,000 Units by mouth daily. 180 capsule 3    methylpheniDATE (RITALIN) 10 MG tablet Take 1 tablet by mouth 2 times daily. 60 tablet 0    Probiotic Product (Probiotic-10) Chew Tab Chew 1 tablet by mouth daily.      naLOXone (NARCAN) 4 MG/0.1ML nasal liquid Spray the content of 1 device into 1 nostril. Call 911. May repeat with 2nd device in alternate nostril if no response in 2-3 minutes. 2 each 0    acetaminophen (TYLENOL) 500 MG tablet Take 2 tablets by mouth every 8 hours as needed for Pain or Fever.       No current facility-administered medications for this visit.        Allergies  ALLERGIES:   Allergen Reactions    Levaquin SWELLING     Tongue swelling         REVIEW OF SYSTEMS:    Constitutional: +  fatigue.  +  weight loss, no fever, no chills, no night sweats.  Eyes: no diplopia, no transient or permanent loss of vision, no scotomata.  ENT/mouth: + tinnitus, + loss of  hearing, no epistaxis, no hoarseness, no oral ulcers,  no gingival bleeding, no sore throat, +  postnasal drip, +  nasal drip, no mouth pain, no sinus pain + dry mouth  Cardiovascular: no chest pain, no palpitations, no syncope, no upper extremity edema, no lower extremity edema, no calf discomfort.  Respiratory: +  cough, no hemoptysis, +  dyspnea, no pleurisy, no wheezing.  Gastrointestinal: no abdominal pain, no nausea, no vomiting, no constipation, no hematochezia, no jaundice, no abdominal cramping, no hematemesis, no diarrhea, no melena, no dyspepsia, +  dysphagia.  Musculoskeletal: + back pain  Skin: no rash, no nodules, no pruritus, no lesions.  Neurologic: no confusion, no seizures, no syncope, no tremor, no speech change, no headache, no hiccups, no abnormal gait, no weakness, no sensory changes.  Psychiatric: no anxiety.  +  depression, concentration normal.  Endocrine: no polyuria, no polydipsia, no thyroid disease symptoms, no hot flashes.  Hematologic: no epistaxis, no gingival bleeding, no petechiae, no ecchymosis.  Lymphatic: no lymphadenopathy, no lymphedema.  Allergy/immunologic: no eczema, no frequent mucous membrane infections, no frequent respiratory infections, no recurrent urticaria, no frequent skin infections.     OBJECTIVE:      Vital Signs  There were no vitals filed for this visit.     ECOG   ECOG Performance Status         Physical Examination  General: awake, alert oriented, NAD, appears staged age  Skin: skin color, texture, and turgor normal. No visible rashes or skin lesions  Head: normocephalic, without obvious abnormality  Eyes: conjunctiva clear bilaterally, no scleral icterus   Ears/Nose/Throat: + post surgical changes, + dry mm  Neck: + surgical changes  Lungs: clear to auscultation bilaterally  Heart: regular rate and rhythm. S1 and S2 normal. No murmur/rubs/gallops.   Abdomen: soft, non-tender, non-distended, bowel sounds normal.  No organomegaly.  Lymph Nodes: no cervical adenopathy, no axillary adenopathy, no supraclavicular  adenopathy.  Musculoskeletal: symmetrical strength and tone.  Neurologic:  awake, alert, oriented x 3, no focal deficits.    ASSESSMENT/PLAN:  76 year old gentleman with head and neck cancer presents for follow-up.    1. Right base of tongue carcinoma: He was diagnosed on September 7, 2023 with metastatic squamous cell carcinoma, HPV associated.  P16 is diffusely positive.  PET scan showed ipsilateral lymph node involvement as well as base of tongue primary site.  He underwent resection on October 12, 2023.  He was staged as a T1 N1.  He started radiation therapy on November 19, 2023.  He completed radiation on January 3, 2024.  PET scan on April 1, 2024 revealed asymmetric moderate radiotracer uptake in the right mid to posterior tongue measuring 5.5 x 3.5 x 2.8 cm; mild FDG uptake within 2 pretracheal and left perihilar lymph nodes; stable mild FDG uptake within the solitary enlarged left inguinal chain lymph node.   Dr. Crain did a posterior tongue biopsy on 04/09/2024. This was consistent with invasive squamous cell carcinoma (negative for p16).  MRI on April 13, 2024 revealed enhancement and edema throughout the soft tissue of the pharynx, glottis, and tongue. He had a CT neck on 04/15/2024 which showed postoperative changes of right base of tongue resection and right neck dissection; no mass identified.      He was evaluated by Dr. Morrison.  Dr. Morrison did a direct laryngoscopy with biopsies on 04/25/2024  to properly map the extent of disease.  Pathology revealed    We will hold off on inguinal node biopsy at this time.  We will follow this with imaging.    She will present at tumor board at [facility]. Surgery is possible but he would need a hemiglossectomy, trach, PEG, and flap reconstruction. My concern is that he would not tolerate surgery and he would need systemic chemotherapy afterwards.  We could consider systemic therapy alone but this would be palliative. He is recurred 3 months postradiation  therapy. I will wait for tumor board recommendations from [place].  We will send next generation sequencing on his tumor. I will ask pathology to do CPS on the specimen.  We discussed the options of systemic therapy which include platinum/5-FU/pembrolizumab vs pembroliozumab alone (if CPS>1)    2. Hilar nodes: I suspect that this is reactive. This was present on imaging in September 2023.       I will see him back in 2 weeks to discuss treatment options.     All of the patient's questions were answered to their satisfaction.     No LOS data to display  This includes pre-charting, chart review, documenting, and referring/communicating with other health care professionals.    -----------------------------------------------------------------------------------------------------------------------  Charting performed by Jennifer BYERS for Dr. Kumar  The documentation recorded by the jennifer accurately reflects the service I personally performed and the decisions made by gopi Kumar MD

## 2024-07-31 RX ORDER — LISINOPRIL 20 MG/1
20 TABLET ORAL DAILY
Qty: 90 TABLET | Refills: 1 | Status: SHIPPED | OUTPATIENT
Start: 2024-07-31

## 2024-07-31 NOTE — TELEPHONE ENCOUNTER
Braulio called requesting a refill of the below medication which has been pended for you: states he is out of med    Requested Prescriptions     Pending Prescriptions Disp Refills    lisinopril (PRINIVIL;ZESTRIL) 20 MG tablet [Pharmacy Med Name: LISINOPRIL 20MG TABS] 90 tablet 0     Sig: TAKE ONE TABLET BY MOUTH ONCE A DAY       Last Appointment Date: 1/30/2024 [facility]  Next Appointment Date: 8/2/24    Allergies   Allergen Reactions    Pcn [Penicillins]      Unknown reaction as child

## 2024-07-31 NOTE — TELEPHONE ENCOUNTER
Braulio called requesting a refill of the below medication which has been pended for you:     Requested Prescriptions     Pending Prescriptions Disp Refills    lisinopril (PRINIVIL;ZESTRIL) 20 MG tablet [Pharmacy Med Name: LISINOPRIL 20MG TABS] 90 tablet 0     Sig: TAKE ONE TABLET BY MOUTH ONCE A DAY       Last Appointment Date: 1/30/2024  Next Appointment Date: 8/2/2024    Allergies   Allergen Reactions    Pcn [Penicillins]      Unknown reaction as child

## 2024-08-02 ENCOUNTER — OFFICE VISIT (OUTPATIENT)
Dept: FAMILY MEDICINE CLINIC | Age: 54
End: 2024-08-02
Payer: COMMERCIAL

## 2024-08-02 ENCOUNTER — TELEPHONE (OUTPATIENT)
Dept: SURGERY | Age: 54
End: 2024-08-02

## 2024-08-02 ENCOUNTER — HOSPITAL ENCOUNTER (OUTPATIENT)
Age: 54
Discharge: HOME | End: 2024-08-02
Payer: COMMERCIAL

## 2024-08-02 VITALS
DIASTOLIC BLOOD PRESSURE: 84 MMHG | WEIGHT: 234.3 LBS | HEIGHT: 69 IN | SYSTOLIC BLOOD PRESSURE: 114 MMHG | TEMPERATURE: 97.7 F | RESPIRATION RATE: 16 BRPM | OXYGEN SATURATION: 94 % | BODY MASS INDEX: 34.7 KG/M2 | HEART RATE: 68 BPM

## 2024-08-02 DIAGNOSIS — Z12.5 SCREENING FOR PROSTATE CANCER: ICD-10-CM

## 2024-08-02 DIAGNOSIS — Z12.11 ENCOUNTER FOR SCREENING COLONOSCOPY: ICD-10-CM

## 2024-08-02 DIAGNOSIS — Z00.00 ROUTINE GENERAL MEDICAL EXAMINATION AT A HEALTH CARE FACILITY: Primary | ICD-10-CM

## 2024-08-02 DIAGNOSIS — R73.01 IMPAIRED FASTING GLUCOSE: ICD-10-CM

## 2024-08-02 DIAGNOSIS — Z00.00 ROUTINE GENERAL MEDICAL EXAMINATION AT A HEALTH CARE FACILITY: ICD-10-CM

## 2024-08-02 DIAGNOSIS — I10 PRIMARY HYPERTENSION: ICD-10-CM

## 2024-08-02 LAB
ALBUMIN SERPL-MCNC: 4.6 G/DL (ref 3.5–5.2)
ALBUMIN/GLOB SERPL: 1.3 {RATIO} (ref 1–2.5)
ALP SERPL-CCNC: 59 U/L (ref 40–129)
ALT SERPL-CCNC: 22 U/L (ref 5–41)
ANION GAP SERPL CALCULATED.3IONS-SCNC: 14 MMOL/L (ref 9–17)
AST SERPL-CCNC: 23 U/L
BASOPHILS # BLD: 0.03 K/UL (ref 0–0.2)
BASOPHILS NFR BLD: 1 % (ref 0–2)
BILIRUB SERPL-MCNC: 0.9 MG/DL (ref 0.3–1.2)
BUN SERPL-MCNC: 13 MG/DL (ref 6–20)
BUN/CREAT SERPL: 13 (ref 9–20)
CALCIUM SERPL-MCNC: 9.7 MG/DL (ref 8.6–10.4)
CHLORIDE SERPL-SCNC: 94 MMOL/L (ref 98–107)
CHOLEST SERPL-MCNC: 200 MG/DL (ref 0–199)
CHOLESTEROL/HDL RATIO: 3
CO2 SERPL-SCNC: 25 MMOL/L (ref 20–31)
CREAT SERPL-MCNC: 1 MG/DL (ref 0.7–1.2)
EOSINOPHIL # BLD: 0.15 K/UL (ref 0–0.44)
EOSINOPHILS RELATIVE PERCENT: 3 % (ref 1–4)
ERYTHROCYTE [DISTWIDTH] IN BLOOD BY AUTOMATED COUNT: 13.2 % (ref 11.8–14.4)
EST. AVERAGE GLUCOSE BLD GHB EST-MCNC: 100 MG/DL
GFR, ESTIMATED: >90 ML/MIN/1.73M2
GLUCOSE SERPL-MCNC: 92 MG/DL (ref 70–99)
HBA1C MFR BLD: 5.1 % (ref 4–6)
HCT VFR BLD AUTO: 44.3 % (ref 40.7–50.3)
HDLC SERPL-MCNC: 65 MG/DL
HGB BLD-MCNC: 15.4 G/DL (ref 13–17)
IMM GRANULOCYTES # BLD AUTO: <0.03 K/UL (ref 0–0.3)
IMM GRANULOCYTES NFR BLD: 0 %
LDLC SERPL CALC-MCNC: 120 MG/DL (ref 0–100)
LYMPHOCYTES NFR BLD: 1.1 K/UL (ref 1.1–3.7)
LYMPHOCYTES RELATIVE PERCENT: 23 % (ref 24–43)
MCH RBC QN AUTO: 29.5 PG (ref 25.2–33.5)
MCHC RBC AUTO-ENTMCNC: 34.8 G/DL (ref 25.2–33.5)
MCV RBC AUTO: 84.9 FL (ref 82.6–102.9)
MONOCYTES NFR BLD: 0.74 K/UL (ref 0.1–1.2)
MONOCYTES NFR BLD: 16 % (ref 3–12)
NEUTROPHILS NFR BLD: 57 % (ref 36–65)
NEUTS SEG NFR BLD: 2.7 K/UL (ref 1.5–8.1)
NRBC BLD-RTO: 0 PER 100 WBC
PLATELET # BLD AUTO: 216 K/UL (ref 138–453)
PMV BLD AUTO: 10.4 FL (ref 8.1–13.5)
POTASSIUM SERPL-SCNC: 3.8 MMOL/L (ref 3.7–5.3)
PROT SERPL-MCNC: 8.1 G/DL (ref 6.4–8.3)
PSA SERPL-MCNC: 1.6 NG/ML (ref 0–4)
RBC # BLD AUTO: 5.22 M/UL (ref 4.21–5.77)
SODIUM SERPL-SCNC: 133 MMOL/L (ref 135–144)
TRIGL SERPL-MCNC: 78 MG/DL
VLDLC SERPL CALC-MCNC: 16 MG/DL
WBC OTHER # BLD: 4.7 K/UL (ref 3.5–11.3)

## 2024-08-02 PROCEDURE — 83036 HEMOGLOBIN GLYCOSYLATED A1C: CPT

## 2024-08-02 PROCEDURE — G0103 PSA SCREENING: HCPCS

## 2024-08-02 PROCEDURE — 36415 COLL VENOUS BLD VENIPUNCTURE: CPT

## 2024-08-02 PROCEDURE — 3079F DIAST BP 80-89 MM HG: CPT | Performed by: FAMILY MEDICINE

## 2024-08-02 PROCEDURE — 80061 LIPID PANEL: CPT

## 2024-08-02 PROCEDURE — 99396 PREV VISIT EST AGE 40-64: CPT | Performed by: FAMILY MEDICINE

## 2024-08-02 PROCEDURE — 80053 COMPREHEN METABOLIC PANEL: CPT

## 2024-08-02 PROCEDURE — 3074F SYST BP LT 130 MM HG: CPT | Performed by: FAMILY MEDICINE

## 2024-08-02 PROCEDURE — 85025 COMPLETE CBC W/AUTO DIFF WBC: CPT

## 2024-08-02 SDOH — ECONOMIC STABILITY: FOOD INSECURITY: WITHIN THE PAST 12 MONTHS, YOU WORRIED THAT YOUR FOOD WOULD RUN OUT BEFORE YOU GOT MONEY TO BUY MORE.: NEVER TRUE

## 2024-08-02 SDOH — ECONOMIC STABILITY: FOOD INSECURITY: WITHIN THE PAST 12 MONTHS, THE FOOD YOU BOUGHT JUST DIDN'T LAST AND YOU DIDN'T HAVE MONEY TO GET MORE.: NEVER TRUE

## 2024-08-02 SDOH — ECONOMIC STABILITY: INCOME INSECURITY: HOW HARD IS IT FOR YOU TO PAY FOR THE VERY BASICS LIKE FOOD, HOUSING, MEDICAL CARE, AND HEATING?: NOT HARD AT ALL

## 2024-08-02 ASSESSMENT — ENCOUNTER SYMPTOMS
TROUBLE SWALLOWING: 0
EYE DISCHARGE: 0
WHEEZING: 0
ABDOMINAL PAIN: 0
SINUS PRESSURE: 0
CONSTIPATION: 0
COUGH: 0
SORE THROAT: 0
NAUSEA: 0
RHINORRHEA: 0
SHORTNESS OF BREATH: 0
VOMITING: 0
EYE REDNESS: 0
DIARRHEA: 0

## 2024-08-02 NOTE — PROGRESS NOTES
2024     Braulio Gonzalez (:  1970) is a 53 y.o. male, here for evaluation of the following medical concerns:    HPI  Patient comes in today for a routine general physical exam and for follow up of chronic health issues Patient overall is doing relatively well.  Was having issues with gout but once we switched his blood pressure medicine so that it does not have the diuretic he is doing better and has not had any further recurrence.  Blood pressure seems to be under good control.  Does state that he has had some issues with intermittent swelling to his knees.  States that he started taking vitamin D and that seems to be helping with his overall musculoskeletal symptoms.  Otherwise today no other acute medical concerns.  Does have a known history of impaired fasting glucose and he did state that about 9 days ago he did start eating better and is starting to exercise again and feels better overall.  Would encourage continued low-carb/low sugar diet to keep this optimally controlled.  No other acute issues at this time..  Patient's recent lab reports are as follows:    Results for orders placed or performed during the hospital encounter of 23   PSA Screening   Result Value Ref Range    PSA 1.59 <4.1 ng/mL   Lipid Panel   Result Value Ref Range    Cholesterol 187 <200 mg/dL    HDL 56 >40 mg/dL    LDL Cholesterol 114 0 - 130 mg/dL    Chol/HDL Ratio 3.3 <5    Triglycerides 85 <150 mg/dL   CBC with Auto Differential   Result Value Ref Range    WBC 5.5 3.5 - 11.3 k/uL    RBC 4.84 4.21 - 5.77 m/uL    Hemoglobin 14.5 13.0 - 17.0 g/dL    Hematocrit 43.2 40.7 - 50.3 %    MCV 89.3 82.6 - 102.9 fL    MCH 30.0 25.2 - 33.5 pg    MCHC 33.6 (H) 25.2 - 33.5 g/dL    RDW 13.3 11.8 - 14.4 %    Platelets 240 138 - 453 k/uL    MPV 10.0 8.1 - 13.5 fL    NRBC Automated 0.0 0.0 per 100 WBC    Seg Neutrophils 61 36 - 65 %    Lymphocytes 25 24 - 43 %    Monocytes % 9 3 - 12 %    Eosinophils % 4 1 - 4 %    Basophils % 1 0 - 2

## 2025-02-12 ENCOUNTER — PREP FOR PROCEDURE (OUTPATIENT)
Dept: SURGERY | Age: 55
End: 2025-02-12

## 2025-02-12 DIAGNOSIS — Z12.11 SCREEN FOR COLON CANCER: ICD-10-CM

## 2025-03-12 NOTE — H&P
Subjective   Braulio Gonzalez is a 54 y.o. male who presents today for screening colonoscopy.  No prior screening.  Denies any recent changes in bowel movements, blood per rectum, melena or unintended weight loss.  No reported family hx of colon cancer.    Past Medical History:   Diagnosis Date    Hypertension        Past Surgical History:   Procedure Laterality Date    CERVICAL DISC SURGERY  1997    HAND TENDON SURGERY Right 8/3/2022    Repair Extensor Tendon Right Middle Finger W/ RIGHT MIDDLE FINGER DEBRIDEMENT ET RIGHT INDEX FINGER FRACTURE CARE performed by Raquel Howard MD at [facility] OR    HERNIA REPAIR  1986       No current facility-administered medications for this encounter.     Current Outpatient Medications   Medication Sig Dispense Refill    bisacodyl 5 MG EC tablet Take 2 tabs by mouth at noon the day prior to your procedure. 2 tablet 0    polyethylene glycol (GOLYTELY) 236 g solution Mix as directed. At 4pm the day prior to your procedure, drink an 8oz glass every 10 minutes until gone. 4000 mL 0    lisinopril (PRINIVIL;ZESTRIL) 20 MG tablet TAKE ONE TABLET BY MOUTH ONCE A DAY 90 tablet 1    allopurinol (ZYLOPRIM) 300 MG tablet Take 1 tablet by mouth daily 90 tablet 1       Allergies   Allergen Reactions    Pcn [Penicillins]      Unknown reaction as child       Family History   Problem Relation Age of Onset    Cancer Mother         pancreatic-age 63    High Blood Pressure Father     High Blood Pressure Sister        Social History     Socioeconomic History    Marital status: Unknown     Spouse name: Not on file    Number of children: Not on file    Years of education: Not on file    Highest education level: Not on file   Occupational History    Not on file   Tobacco Use    Smoking status: Never     Passive exposure: Never    Smokeless tobacco: Former     Types: Chew   Vaping Use    Vaping status: Never Used   Substance and Sexual Activity    Alcohol use: Yes    Drug use: Never    Sexual activity:

## 2025-03-13 ENCOUNTER — ANESTHESIA EVENT (OUTPATIENT)
Dept: OPERATING ROOM | Age: 55
End: 2025-03-13
Payer: COMMERCIAL

## 2025-03-13 ENCOUNTER — HOSPITAL ENCOUNTER (OUTPATIENT)
Age: 55
Setting detail: OUTPATIENT SURGERY
Discharge: HOME | End: 2025-03-13
Attending: SURGERY | Admitting: SURGERY
Payer: COMMERCIAL

## 2025-03-13 ENCOUNTER — ANESTHESIA (OUTPATIENT)
Dept: OPERATING ROOM | Age: 55
End: 2025-03-13
Payer: COMMERCIAL

## 2025-03-13 VITALS
HEART RATE: 48 BPM | TEMPERATURE: 98.4 F | WEIGHT: 220 LBS | BODY MASS INDEX: 32.58 KG/M2 | HEIGHT: 69 IN | SYSTOLIC BLOOD PRESSURE: 168 MMHG | DIASTOLIC BLOOD PRESSURE: 98 MMHG | OXYGEN SATURATION: 99 % | RESPIRATION RATE: 16 BRPM

## 2025-03-13 DIAGNOSIS — Z12.11 SCREEN FOR COLON CANCER: ICD-10-CM

## 2025-03-13 PROCEDURE — 88305 TISSUE EXAM BY PATHOLOGIST: CPT

## 2025-03-13 PROCEDURE — 2580000003 HC RX 258: Performed by: SURGERY

## 2025-03-13 PROCEDURE — 2709999900 HC NON-CHARGEABLE SUPPLY: Performed by: SURGERY

## 2025-03-13 PROCEDURE — 3609010400 HC COLONOSCOPY POLYPECTOMY HOT BIOPSY: Performed by: SURGERY

## 2025-03-13 PROCEDURE — 7100000011 HC PHASE II RECOVERY - ADDTL 15 MIN: Performed by: SURGERY

## 2025-03-13 PROCEDURE — 3700000001 HC ADD 15 MINUTES (ANESTHESIA): Performed by: SURGERY

## 2025-03-13 PROCEDURE — 6360000002 HC RX W HCPCS: Performed by: NURSE ANESTHETIST, CERTIFIED REGISTERED

## 2025-03-13 PROCEDURE — 7100000010 HC PHASE II RECOVERY - FIRST 15 MIN: Performed by: SURGERY

## 2025-03-13 PROCEDURE — 3700000000 HC ANESTHESIA ATTENDED CARE: Performed by: SURGERY

## 2025-03-13 RX ORDER — SODIUM CHLORIDE, SODIUM LACTATE, POTASSIUM CHLORIDE, CALCIUM CHLORIDE 600; 310; 30; 20 MG/100ML; MG/100ML; MG/100ML; MG/100ML
INJECTION, SOLUTION INTRAVENOUS CONTINUOUS
Status: DISCONTINUED | OUTPATIENT
Start: 2025-03-13 | End: 2025-03-13 | Stop reason: HOSPADM

## 2025-03-13 RX ORDER — SODIUM CHLORIDE 0.9 % (FLUSH) 0.9 %
5-40 SYRINGE (ML) INJECTION PRN
Status: DISCONTINUED | OUTPATIENT
Start: 2025-03-13 | End: 2025-03-13 | Stop reason: HOSPADM

## 2025-03-13 RX ORDER — SODIUM CHLORIDE 9 MG/ML
INJECTION, SOLUTION INTRAVENOUS PRN
Status: DISCONTINUED | OUTPATIENT
Start: 2025-03-13 | End: 2025-03-13 | Stop reason: HOSPADM

## 2025-03-13 RX ORDER — SODIUM CHLORIDE 0.9 % (FLUSH) 0.9 %
5-40 SYRINGE (ML) INJECTION EVERY 12 HOURS SCHEDULED
Status: DISCONTINUED | OUTPATIENT
Start: 2025-03-13 | End: 2025-03-13 | Stop reason: HOSPADM

## 2025-03-13 RX ORDER — PROPOFOL 10 MG/ML
INJECTION, EMULSION INTRAVENOUS
Status: DISCONTINUED | OUTPATIENT
Start: 2025-03-13 | End: 2025-03-13 | Stop reason: SDUPTHER

## 2025-03-13 RX ADMIN — SODIUM CHLORIDE, SODIUM LACTATE, POTASSIUM CHLORIDE, AND CALCIUM CHLORIDE: .6; .31; .03; .02 INJECTION, SOLUTION INTRAVENOUS at 07:22

## 2025-03-13 RX ADMIN — PROPOFOL 100 MG: 10 INJECTION, EMULSION INTRAVENOUS at 07:35

## 2025-03-13 RX ADMIN — PROPOFOL 200 MCG/KG/MIN: 10 INJECTION, EMULSION INTRAVENOUS at 07:36

## 2025-03-13 ASSESSMENT — PAIN - FUNCTIONAL ASSESSMENT: PAIN_FUNCTIONAL_ASSESSMENT: 0-10

## 2025-03-13 ASSESSMENT — PAIN SCALES - GENERAL
PAINLEVEL_OUTOF10: 0
PAINLEVEL_OUTOF10: 0

## 2025-03-13 NOTE — PROCEDURES
which was identified by appendiceal orifice and ileocecal valve.  During advancement of scope, bowel prep was adequate.  Scope was then slowly withdrawn from the colon, withdrawal time being greater than 7 minutes.  During this time, colonic mucosa was carefully inspected.  There was a small polyp in the ascending colon, which was removed with hot forceps and sent as specimen.  Additionally, there were some scattered diverticula in the sigmoid colon, but no evidence of any inflammation.  No other findings in the colon.  Once in the rectum, retroflexed view showed internal hemorrhoids, but no other abnormalities.  Scope was then straightened and removed.  The procedure was concluded.  At conclusion of procedure, the patient was in stable condition, was taken to PACU for recovery.    PLAN:  We will follow up results of pathology and make final recommendations that time.          ERMA KUMAR DO      D:  03/13/2025 07:56:06     T:  03/13/2025 08:31:12     DESI/LEN  Job #:  [id number]     Doc#:  [id number]    CC:   Primary Care

## 2025-03-13 NOTE — ANESTHESIA PRE PROCEDURE
Department of Anesthesiology  Preprocedure Note       Name:  Braulio Gonzalez   Age:  54 y.o.  :  1970                                          MRN:  [id number]         Date:  3/13/2025      Surgeon: Surgeon(s):  Migue Vidal DO    Procedure: Procedure(s):  Colonoscopy    Medications prior to admission:   Prior to Admission medications    Medication Sig Start Date End Date Taking? Authorizing Provider   lisinopril (PRINIVIL;ZESTRIL) 20 MG tablet TAKE ONE TABLET BY MOUTH ONCE A DAY 24  Yes Eneida Garcia DO   allopurinol (ZYLOPRIM) 300 MG tablet Take 1 tablet by mouth daily 3/6/24  Yes Eneida Garcia DO       Current medications:    Current Facility-Administered Medications   Medication Dose Route Frequency Provider Last Rate Last Admin   • lactated ringers infusion   IntraVENous Continuous Migue Vidal  mL/hr at 25 0722 New Bag at 25 0722   • sodium chloride flush 0.9 % injection 5-40 mL  5-40 mL IntraVENous 2 times per day Migue Vidal DO       • sodium chloride flush 0.9 % injection 5-40 mL  5-40 mL IntraVENous PRN Migue Vidal DO       • 0.9 % sodium chloride infusion   IntraVENous PRN Migue Vidal DO           Allergies:    Allergies   Allergen Reactions   • Pcn [Penicillins]      Unknown reaction as child       Problem List:    Patient Active Problem List   Diagnosis Code   • Extensor tendon laceration, finger, open wound, initial encounter S56.429A, S61.209A   • Primary hypertension I10   • Impaired fasting glucose R73.01   • Screen for colon cancer Z12.11       Past Medical History:        Diagnosis Date   • Hypertension        Past Surgical History:        Procedure Laterality Date   • CERVICAL DISC SURGERY     • HAND TENDON SURGERY Right 8/3/2022    Repair Extensor Tendon Right Middle Finger W/ RIGHT MIDDLE FINGER DEBRIDEMENT ET RIGHT INDEX FINGER FRACTURE CARE performed by Raquel Howard MD at [facility] OR   • HERNIA REPAIR

## 2025-03-14 PROBLEM — Z12.11 SCREEN FOR COLON CANCER: Status: RESOLVED | Noted: 2025-02-12 | Resolved: 2025-03-14

## 2025-03-14 LAB — SURGICAL PATHOLOGY REPORT: NORMAL

## 2025-03-19 ENCOUNTER — RESULTS FOLLOW-UP (OUTPATIENT)
Dept: OPERATING ROOM | Age: 55
End: 2025-03-19

## 2025-05-15 RX ORDER — LISINOPRIL 20 MG/1
20 TABLET ORAL DAILY
Qty: 90 TABLET | Refills: 1 | Status: SHIPPED | OUTPATIENT
Start: 2025-05-15

## 2025-05-15 NOTE — TELEPHONE ENCOUNTER
Braulio called requesting a refill of the below medication which has been pended for you:     Requested Prescriptions     Pending Prescriptions Disp Refills    lisinopril (PRINIVIL;ZESTRIL) 20 MG tablet 90 tablet 1     Sig: Take 1 tablet by mouth daily       Last Appointment Date: 8/2/2024  Next Appointment Date: 8/5/2025    Allergies   Allergen Reactions    Pcn [Penicillins]      Unknown reaction as child

## 2025-08-05 ENCOUNTER — HOSPITAL ENCOUNTER (OUTPATIENT)
Dept: LAB | Age: 55
Discharge: HOME | End: 2025-08-05
Payer: COMMERCIAL

## 2025-08-05 ENCOUNTER — OFFICE VISIT (OUTPATIENT)
Dept: FAMILY MEDICINE CLINIC | Age: 55
End: 2025-08-05
Payer: COMMERCIAL

## 2025-08-05 VITALS
WEIGHT: 225 LBS | SYSTOLIC BLOOD PRESSURE: 138 MMHG | RESPIRATION RATE: 16 BRPM | TEMPERATURE: 97 F | OXYGEN SATURATION: 97 % | HEART RATE: 60 BPM | BODY MASS INDEX: 33.33 KG/M2 | DIASTOLIC BLOOD PRESSURE: 88 MMHG | HEIGHT: 69 IN

## 2025-08-05 DIAGNOSIS — Z12.5 SCREENING FOR PROSTATE CANCER: ICD-10-CM

## 2025-08-05 DIAGNOSIS — Z00.00 ROUTINE GENERAL MEDICAL EXAMINATION AT A HEALTH CARE FACILITY: Primary | ICD-10-CM

## 2025-08-05 DIAGNOSIS — R73.01 IMPAIRED FASTING GLUCOSE: ICD-10-CM

## 2025-08-05 DIAGNOSIS — I10 PRIMARY HYPERTENSION: ICD-10-CM

## 2025-08-05 DIAGNOSIS — E78.2 MIXED HYPERLIPIDEMIA: ICD-10-CM

## 2025-08-05 DIAGNOSIS — R20.2 PARESTHESIA OF BOTH HANDS: ICD-10-CM

## 2025-08-05 DIAGNOSIS — Z00.00 ROUTINE GENERAL MEDICAL EXAMINATION AT A HEALTH CARE FACILITY: ICD-10-CM

## 2025-08-05 LAB
ALBUMIN SERPL-MCNC: 4.6 G/DL (ref 3.5–5.2)
ALBUMIN/GLOB SERPL: 1.7 {RATIO} (ref 1–2.5)
ALP SERPL-CCNC: 50 U/L (ref 40–129)
ALT SERPL-CCNC: 20 U/L (ref 10–50)
ANION GAP SERPL CALCULATED.3IONS-SCNC: 10 MMOL/L (ref 9–16)
AST SERPL-CCNC: 23 U/L (ref 10–50)
BASOPHILS # BLD: 0.05 K/UL (ref 0–0.2)
BASOPHILS NFR BLD: 1 % (ref 0–2)
BILIRUB SERPL-MCNC: 0.6 MG/DL (ref 0–1.2)
BUN SERPL-MCNC: 21 MG/DL (ref 6–20)
BUN/CREAT SERPL: 19 (ref 9–20)
CALCIUM SERPL-MCNC: 9.5 MG/DL (ref 8.6–10.4)
CHLORIDE SERPL-SCNC: 102 MMOL/L (ref 98–107)
CHOLEST SERPL-MCNC: 214 MG/DL (ref 0–199)
CHOLESTEROL/HDL RATIO: 3.1
CO2 SERPL-SCNC: 24 MMOL/L (ref 20–31)
CREAT SERPL-MCNC: 1.1 MG/DL (ref 0.7–1.2)
EOSINOPHIL # BLD: 0.27 K/UL (ref 0–0.44)
EOSINOPHILS RELATIVE PERCENT: 5 % (ref 1–4)
ERYTHROCYTE [DISTWIDTH] IN BLOOD BY AUTOMATED COUNT: 13.4 % (ref 11.8–14.4)
EST. AVERAGE GLUCOSE BLD GHB EST-MCNC: 100 MG/DL
GFR, ESTIMATED: 80 ML/MIN/1.73M2
GLUCOSE SERPL-MCNC: 112 MG/DL (ref 74–99)
HBA1C MFR BLD: 5.1 % (ref 4–6)
HCT VFR BLD AUTO: 44.6 % (ref 40.7–50.3)
HDLC SERPL-MCNC: 69 MG/DL
HGB BLD-MCNC: 14.6 G/DL (ref 13–17)
IMM GRANULOCYTES # BLD AUTO: <0.03 K/UL (ref 0–0.3)
IMM GRANULOCYTES NFR BLD: 0 %
LDLC SERPL CALC-MCNC: 130 MG/DL (ref 0–100)
LYMPHOCYTES NFR BLD: 1.35 K/UL (ref 1.1–3.7)
LYMPHOCYTES RELATIVE PERCENT: 23 % (ref 24–43)
MCH RBC QN AUTO: 29.4 PG (ref 25.2–33.5)
MCHC RBC AUTO-ENTMCNC: 32.7 G/DL (ref 25.2–33.5)
MCV RBC AUTO: 89.9 FL (ref 82.6–102.9)
MONOCYTES NFR BLD: 0.75 K/UL (ref 0.1–1.2)
MONOCYTES NFR BLD: 13 % (ref 3–12)
NEUTROPHILS NFR BLD: 58 % (ref 36–65)
NEUTS SEG NFR BLD: 3.45 K/UL (ref 1.5–8.1)
NRBC BLD-RTO: 0 PER 100 WBC
PLATELET # BLD AUTO: 198 K/UL (ref 138–453)
PMV BLD AUTO: 10.3 FL (ref 8.1–13.5)
POTASSIUM SERPL-SCNC: 4.2 MMOL/L (ref 3.7–5.3)
PROT SERPL-MCNC: 7.3 G/DL (ref 6.6–8.7)
PSA SERPL-MCNC: 1.24 NG/ML (ref 0–4)
RBC # BLD AUTO: 4.96 M/UL (ref 4.21–5.77)
SODIUM SERPL-SCNC: 136 MMOL/L (ref 136–145)
TRIGL SERPL-MCNC: 75 MG/DL
VLDLC SERPL CALC-MCNC: 15 MG/DL (ref 1–30)
WBC OTHER # BLD: 5.9 K/UL (ref 3.5–11.3)

## 2025-08-05 PROCEDURE — 80061 LIPID PANEL: CPT

## 2025-08-05 PROCEDURE — 3075F SYST BP GE 130 - 139MM HG: CPT | Performed by: FAMILY MEDICINE

## 2025-08-05 PROCEDURE — 36415 COLL VENOUS BLD VENIPUNCTURE: CPT

## 2025-08-05 PROCEDURE — G0103 PSA SCREENING: HCPCS

## 2025-08-05 PROCEDURE — 99396 PREV VISIT EST AGE 40-64: CPT | Performed by: FAMILY MEDICINE

## 2025-08-05 PROCEDURE — 3079F DIAST BP 80-89 MM HG: CPT | Performed by: FAMILY MEDICINE

## 2025-08-05 PROCEDURE — 85025 COMPLETE CBC W/AUTO DIFF WBC: CPT

## 2025-08-05 PROCEDURE — 83036 HEMOGLOBIN GLYCOSYLATED A1C: CPT

## 2025-08-05 PROCEDURE — 80053 COMPREHEN METABOLIC PANEL: CPT

## 2025-08-05 RX ORDER — LISINOPRIL 20 MG/1
40 TABLET ORAL DAILY
Qty: 180 TABLET | Refills: 3 | Status: SHIPPED | OUTPATIENT
Start: 2025-08-05

## 2025-08-05 SDOH — ECONOMIC STABILITY: FOOD INSECURITY: WITHIN THE PAST 12 MONTHS, THE FOOD YOU BOUGHT JUST DIDN'T LAST AND YOU DIDN'T HAVE MONEY TO GET MORE.: NEVER TRUE

## 2025-08-05 SDOH — ECONOMIC STABILITY: FOOD INSECURITY: WITHIN THE PAST 12 MONTHS, YOU WORRIED THAT YOUR FOOD WOULD RUN OUT BEFORE YOU GOT MONEY TO BUY MORE.: NEVER TRUE

## 2025-08-05 ASSESSMENT — ENCOUNTER SYMPTOMS
RHINORRHEA: 0
EYE DISCHARGE: 0
DIARRHEA: 0
VOMITING: 0
SHORTNESS OF BREATH: 0
COUGH: 0
WHEEZING: 0
CONSTIPATION: 0
TROUBLE SWALLOWING: 0
EYE REDNESS: 0
NAUSEA: 0
SORE THROAT: 0
SINUS PRESSURE: 0
ABDOMINAL PAIN: 0

## 2025-08-05 ASSESSMENT — PATIENT HEALTH QUESTIONNAIRE - PHQ9
2. FEELING DOWN, DEPRESSED OR HOPELESS: NOT AT ALL
SUM OF ALL RESPONSES TO PHQ QUESTIONS 1-9: 0
1. LITTLE INTEREST OR PLEASURE IN DOING THINGS: NOT AT ALL

## (undated) DEVICE — CO2 CANNULA,SSOFT,ADLT,7O2,4CO2,FEMALE: Brand: MEDLINE

## (undated) DEVICE — MERCY DEFIANCE ENDO KIT: Brand: MEDLINE INDUSTRIES, INC.

## (undated) DEVICE — BANDAGE GZ W2XL75IN ST RAYON POLY CNFRM STRTCH LTWT

## (undated) DEVICE — BASIN EMESIS 500CC ROSE 250/CS 60/PLT: Brand: MEDEGEN MEDICAL PRODUCTS, LLC

## (undated) DEVICE — BANDAGE GZ ADH LG 4-1/8X4.5 IN 6 PLY STRL KERLIX LF

## (undated) DEVICE — SUTURE PDS II SZ 3-0 L27IN ABSRB CLR SH L26MM 1/2 CIR TAPR Z416H

## (undated) DEVICE — FORCEPS BX L240CM JAW DIA2.2MM RAD JAW 4 HOT DISP

## (undated) DEVICE — BNDG,ELSTC,MATRIX,STRL,3"X5YD,LF,HOOK&LP: Brand: MEDLINE

## (undated) DEVICE — GLOVE SURG SZ 6 THK91MIL LTX FREE SYN POLYISOPRENE ANTI

## (undated) DEVICE — GLOVE ORANGE PI 7   MSG9070

## (undated) DEVICE — GAUZE,SPONGE,FLUFF,6"X6.75",STRL,5/TRAY: Brand: MEDLINE

## (undated) DEVICE — PREMIUM DRY TRAY LF: Brand: MEDLINE INDUSTRIES, INC.

## (undated) DEVICE — SYRINGE,EAR/ULCER, 2 OZ, STERILE: Brand: MEDLINE

## (undated) DEVICE — 4-PORT MANIFOLD: Brand: NEPTUNE 2

## (undated) DEVICE — PACK SURG PROC REMINDER N WVN DISPOSABLE BEAC TIME OUT

## (undated) DEVICE — TOURNIQUET PHLEB M AD FNGR RED SIL RNG DISP TOURNI-COT

## (undated) DEVICE — PACK PROCEDURE SURG EXTREMITY MIN

## (undated) DEVICE — SOLUTION IV IRRIG POUR BRL 0.9% SODIUM CHL 2F7124

## (undated) DEVICE — SOLUTION PREP POVIDONE IOD FOR SKIN MUCOUS MEM PRIOR TO

## (undated) DEVICE — GLOVE ORANGE PI 8   MSG9080